# Patient Record
Sex: MALE | Race: WHITE | NOT HISPANIC OR LATINO | Employment: FULL TIME | URBAN - METROPOLITAN AREA
[De-identification: names, ages, dates, MRNs, and addresses within clinical notes are randomized per-mention and may not be internally consistent; named-entity substitution may affect disease eponyms.]

---

## 2019-01-28 PROBLEM — F41.1 GAD (GENERALIZED ANXIETY DISORDER): Status: ACTIVE | Noted: 2019-01-28

## 2021-05-11 ENCOUNTER — TELEPHONE (OUTPATIENT)
Dept: INTERNAL MEDICINE | Facility: CLINIC | Age: 36
End: 2021-05-11

## 2021-05-11 NOTE — TELEPHONE ENCOUNTER
LM for Allison Valladares pt can be set up as a new patient with [facility], however, if he doesn't show it will be the third no/show and he cannot be rescheduled with this practice.

## 2021-06-18 ENCOUNTER — HOSPITAL ENCOUNTER (OUTPATIENT)
Dept: GENERAL RADIOLOGY | Facility: HOSPITAL | Age: 36
Discharge: HOME | End: 2021-06-18
Admitting: NURSE PRACTITIONER

## 2021-06-18 ENCOUNTER — OFFICE VISIT (OUTPATIENT)
Dept: INTERNAL MEDICINE | Facility: CLINIC | Age: 36
End: 2021-06-18

## 2021-06-18 VITALS
HEIGHT: 67 IN | TEMPERATURE: 98.6 F | RESPIRATION RATE: 18 BRPM | OXYGEN SATURATION: 99 % | HEART RATE: 73 BPM | DIASTOLIC BLOOD PRESSURE: 78 MMHG | SYSTOLIC BLOOD PRESSURE: 140 MMHG | BODY MASS INDEX: 33.65 KG/M2 | WEIGHT: 214.4 LBS

## 2021-06-18 DIAGNOSIS — M54.50 CHRONIC MIDLINE LOW BACK PAIN WITHOUT SCIATICA: Primary | ICD-10-CM

## 2021-06-18 DIAGNOSIS — G89.29 CHRONIC MIDLINE LOW BACK PAIN WITHOUT SCIATICA: Primary | ICD-10-CM

## 2021-06-18 DIAGNOSIS — M54.50 CHRONIC MIDLINE LOW BACK PAIN WITHOUT SCIATICA: ICD-10-CM

## 2021-06-18 DIAGNOSIS — G89.29 CHRONIC MIDLINE LOW BACK PAIN WITHOUT SCIATICA: ICD-10-CM

## 2021-06-18 PROCEDURE — 99213 OFFICE O/P EST LOW 20 MIN: CPT | Performed by: NURSE PRACTITIONER

## 2021-06-18 PROCEDURE — 72100 X-RAY EXAM L-S SPINE 2/3 VWS: CPT

## 2021-06-18 RX ORDER — DIPHENOXYLATE HYDROCHLORIDE AND ATROPINE SULFATE 2.5; .025 MG/1; MG/1
TABLET ORAL DAILY
COMMUNITY
End: 2021-12-12

## 2021-06-18 RX ORDER — MELOXICAM 7.5 MG/1
7.5 TABLET ORAL DAILY
Qty: 30 TABLET | Refills: 0 | Status: SHIPPED | OUTPATIENT
Start: 2021-06-18 | End: 2021-12-10

## 2021-06-18 RX ORDER — IBUPROFEN 200 MG
200 TABLET ORAL EVERY 6 HOURS PRN
COMMUNITY
End: 2021-06-18

## 2021-06-18 NOTE — PATIENT INSTRUCTIONS
Degenerative Disk Disease    Degenerative disk disease is a condition caused by changes that occur in the spinal disks as a person ages. Spinal disks are soft and compressible disks located between the bones of your spine (vertebrae). These disks act like shock absorbers.  Degenerative disk disease can affect the whole spine. However, the neck and lower back are most often affected. Many changes can occur in the spinal disks with aging, such as:  · The spinal disks may dry and shrink.  · Small tears may occur in the tough, outer covering of the disk (annulus).  · The disk space may become smaller due to loss of water.  · Abnormal growths in the bone (spurs) may occur. This can put pressure on the nerve roots exiting the spinal canal, causing pain.  · The spinal canal may become narrowed.  What are the causes?  This condition may be caused by:  · Normal degeneration with age.  · Injuries.  · Certain activities and sports that cause damage.  What increases the risk?  The following factors may make you more likely to develop this condition:  · Being overweight.  · Having a family history of degenerative disk disease.  · Smoking.  · Sudden injury.  · Doing work that requires heavy lifting.  What are the signs or symptoms?  Symptoms of this condition include:  · Pain that varies in intensity. Some people have no pain, while others have severe pain. The location of the pain depends on the part of your backbone that is affected. You may have:  ? Pain in your neck or arm if a disk in your neck area is affected.  ? Pain in your back, buttocks, or legs if a disk in your lower back is affected.  · Pain that becomes worse while bending or reaching up, or with twisting movements.  · Pain that may start gradually and then get worse as time passes. It may also start after a major or minor injury.  · Numbness or tingling in the arms or legs.  How is this diagnosed?  This condition may be diagnosed based on:  · Your symptoms and  medical history.  · A physical exam.  · Imaging tests, including:  ? An X-ray of the spine.  ? MRI.  How is this treated?  This condition may be treated with:  · Medicines.  · Rehabilitation exercises. These activities aim to strengthen muscles in your back and abdomen to better support your spine.  If treatments do not help to relieve your symptoms or you have severe pain, you may need surgery.  Follow these instructions at home:  Medicines  · Take over-the-counter and prescription medicines only as told by your health care provider.  · Do not drive or use heavy machinery while taking prescription pain medicine.  · If you are taking prescription pain medicine, take actions to prevent or treat constipation. Your health care provider may recommend that you:  ? Drink enough fluid to keep your urine pale yellow.  ? Eat foods that are high in fiber, such as fresh fruits and vegetables, whole grains, and beans.  ? Limit foods that are high in fat and processed sugars, such as fried or sweet foods.  ? Take an over-the-counter or prescription medicine for constipation.  Activity  · Rest as told by your health care provider.  · Ask your health care provider what activities are safe for you. Return to your normal activities as directed.  · Avoid sitting for a long time without moving. Get up to take short walks every 1-2 hours. This is important to improve blood flow and breathing. Ask for help if you feel weak or unsteady.  · Perform relaxation exercises as told by your health care provider.  · Maintain good posture.  · Do not lift anything that is heavier than 10 lb (4.5 kg), or the limit that you are told, until your health care provider says that it is safe.  · Follow proper lifting and walking techniques as told by your health care provider.  Managing pain, stiffness, and swelling    · If directed, put ice on the painful area. Icing can help to relieve pain.  ? Put ice in a plastic bag.  ? Place a towel between your  skin and the bag.  ? Leave the ice on for 20 minutes, 2-3 times a day.  · If directed, apply heat to the painful area as often as told by your health care provider. Heat can reduce the stiffness of your muscles. Use the heat source that your health care provider recommends, such as a moist heat pack or a heating pad.  ? Place a towel between your skin and the heat source.  ? Leave the heat on for 20-30 minutes.  ? Remove the heat if your skin turns bright red. This is especially important if you are unable to feel pain, heat, or cold. You may have a greater risk of getting burned.  General instructions  · Change your sitting, standing, and sleeping habits as told by your health care provider.  · Avoid sitting in the same position for long periods of time. Change positions frequently.  · Lose weight or maintain a healthy weight as told by your health care provider.  · Do not use any products that contain nicotine or tobacco, such as cigarettes and e-cigarettes. If you need help quitting, ask your health care provider.  · Wear supportive footwear.  · Keep all follow-up visits as told by your health care provider. This is important. This may include visits for physical therapy.  Contact a health care provider if you:  · Have pain that does not go away within 1-4 weeks.  · Lose your appetite.  · Lose weight without trying.  Get help right away if you:  · Have severe pain.  · Notice weakness in your arms, hands, or legs.  · Begin to lose control of your bladder or bowel movements.  · Have fevers or night sweats.  Summary  · Degenerative disk disease is a condition caused by changes that occur in the spinal disks as a person ages.  · Degenerative disk disease can affect the whole spine. However, the neck and lower back are most often affected.  · Take over-the-counter and prescription medicines only as told by your health care provider.  This information is not intended to replace advice given to you by your health care  provider. Make sure you discuss any questions you have with your health care provider.  Document Revised: 12/13/2018 Document Reviewed: 12/13/2018  [employer] Patient Education © 2021 [employer]

## 2021-06-18 NOTE — PROGRESS NOTES
Chief Complaint   Patient presents with   • Back Pain     lower back    • Establish Care       Subjective     Boaz Valladares is a 36 y.o. male being seen for an acute issue for chronic low back pain. He reports that this started in 2003 his senior year of high school. This happened while playing football, but no known injury.  He describes bilateral lower back pain, sometimes left and sometimes right. This has been intermittently since then. Pain is not radiating into legs (it did for the derick 2 years,  prior to epidurals). He denies leg weakness or numbness.  In 2009, he was evaluted by Neurosurgery, Abigail Kelly for lumbar pain He reports that he was told that he was too young for surgery, and the body will heal itself.  He did epidurals (last 2009), aquatic therapy, PT, chiropratic care and accupuncture. He was in the Urgent care for left lower back pain 5-5-2021. He feels like if he catches it early enough, he can get relief with a steroid pack and Ibuprofen.       History of Present Illness     No Known Allergies      Current Outpatient Medications:   •  ibuprofen (ADVIL,MOTRIN) 200 MG tablet, Take 200 mg by mouth Every 6 (Six) Hours As Needed for Mild Pain ., Disp: , Rfl:   •  multivitamin (MULTI-VITAMIN PO), Take  by mouth Daily., Disp: , Rfl:     The following portions of the patient's history were reviewed and updated as appropriate: allergies, current medications, past family history, past medical history, past social history, past surgical history and problem list.    Review of Systems   Constitutional: Negative.  Negative for activity change, appetite change and chills.   HENT: Negative.    Eyes: Negative.    Respiratory: Negative.    Cardiovascular: Negative.  Negative for chest pain, palpitations and leg swelling.   Endocrine: Negative.    Genitourinary: Negative.    Musculoskeletal: Positive for back pain.   Hematological: Negative.  Negative for adenopathy. Does not bruise/bleed easily.    Psychiatric/Behavioral: Negative.        Assessment     Physical Exam  Vitals reviewed.   Constitutional:       Appearance: Normal appearance. He is obese.   Cardiovascular:      Rate and Rhythm: Normal rate and regular rhythm.      Pulses: Normal pulses.      Heart sounds: Normal heart sounds.   Pulmonary:      Effort: Pulmonary effort is normal.      Breath sounds: Normal breath sounds.   Musculoskeletal:      Lumbar back: No swelling, edema, spasms, tenderness or bony tenderness. Decreased range of motion. Negative right straight leg raise test and negative left straight leg raise test. No scoliosis.   Skin:     Capillary Refill: Capillary refill takes less than 2 seconds.   Neurological:      General: No focal deficit present.      Mental Status: He is alert and oriented to person, place, and time.   Psychiatric:         Mood and Affect: Mood normal.         Thought Content: Thought content normal.         Plan         Diagnoses and all orders for this visit:    1. Chronic midline low back pain without sciatica (Primary)  -     XR Spine Lumbar 2 or 3 View; Future  -     Ambulatory Referral to Physical Therapy Evaluate and treat  -     meloxicam (Mobic) 7.5 MG tablet; Take 1 tablet by mouth Daily. For back pain  Dispense: 30 tablet; Refill: 0    Discussed healthy back care, proper foot wear, cushioning joints.    Discussed DDD, expectation for treatment/chronicity, traction therapy, radicular pain. Will treat with conservative measures.     Follow up in 6 months

## 2021-06-29 ENCOUNTER — HOSPITAL ENCOUNTER (OUTPATIENT)
Dept: PHYSICAL THERAPY | Facility: HOSPITAL | Age: 36
Setting detail: THERAPIES SERIES
Discharge: HOME | End: 2021-06-29

## 2021-06-29 DIAGNOSIS — M54.50 CHRONIC MIDLINE LOW BACK PAIN WITHOUT SCIATICA: Primary | ICD-10-CM

## 2021-06-29 DIAGNOSIS — G89.29 CHRONIC MIDLINE LOW BACK PAIN WITHOUT SCIATICA: Primary | ICD-10-CM

## 2021-06-29 PROCEDURE — 97140 MANUAL THERAPY 1/> REGIONS: CPT

## 2021-06-29 PROCEDURE — 97110 THERAPEUTIC EXERCISES: CPT

## 2021-06-29 PROCEDURE — 97161 PT EVAL LOW COMPLEX 20 MIN: CPT

## 2021-06-29 NOTE — THERAPY EVALUATION
"    Outpatient Physical Therapy Ortho Initial Evaluation   Imani Manzo     Patient Name: Boaz Valladares  : 1985  MRN: [id number]  Today's Date: 2021      Visit Date: 2021    Patient Active Problem List   Diagnosis   • JYOTI (generalized anxiety disorder)   • Chronic midline low back pain without sciatica        Past Medical History:   Diagnosis Date   • Anxiety    • Herniated lumbar intervertebral disc    • Low back pain         History reviewed. No pertinent surgical history.    Visit Dx:     ICD-10-CM ICD-9-CM   1. Chronic midline low back pain without sciatica  M54.5 724.2    G89.29 338.29         Patient History     Row Name 21 1300             History    Chief Complaint  Difficulty Walking;Difficulty with daily activities;Joint stiffness;Muscle tenderness;Muscle weakness;Pain;Tightness  -LN      Type of Pain  Back pain;Hip pain L>R  -LN      Date Current Problem(s) Began  -- flare up since 2020.  -LN      Brief Description of Current Complaint  Patient with history of LBP since he was 16; had a wrestling injury. LBP that comes and goes since then. Has occasional flare-ups- bends over or coughs and has pain. Has done epidurals, aquatic therapy, PT, chiropractor, acupuncture. Has had some benefit from chiropractor.  most recent flare up since 2020. He did have a fall stepping out of his trunk in December and slipped on ice.  Patient reports hx of herniated discs when he was younger. \"My doctor said I have degeneration in my back.\"   -LN      Previous treatment for THIS PROBLEM  Injections;Chiropractor;Rehabilitation;Medication;Massage;Pain Management epidurals  -LN      Patient/Caregiver Goals  Relieve pain;Know what to do to help the symptoms  -LN      Patient/Caregiver Goals Comment  \"To feel better and reduce pain level.\"   -LN      Occupation/sports/leisure activities  works on rail cars; likes to fish, hunt, weight lift and wrestling.  -LN      Patient seeing anyone " else for problem(s)?  PCP  -LN      How has patient tried to help current problem?  steroid shot, epidurals- 10 years ago; chirpractor, HP; home TENS unit; pain meds if needed from PCP  -LN      What clinical tests have you had for this problem?  MRI;X-ray  -LN      Results of Clinical Tests  herniated L2 and L4 per patient in past; x-ray showed DDD per patient. Minimal multilevel degenerative disc space narrowing.  -LN      Related/Recent Hospitalizations  No  -LN      Surgery/Hospitalization  n/a  -LN      History of Previous Related Injuries  wrestling injury when he was 16 or 17; fall in Dec. 2020.   -LN         Pain     Pain Location  Back;Hip B  hips; L>R  -LN      Pain at Present  4  -LN      Pain at Best  2;3  -LN      Pain at Worst  6;7  -LN      Pain Frequency  Constant/continuous  -LN      Pain Description  Tightness;Aching  -LN      What Performance Factors Make the Current Problem(s) WORSE?  bending over; after sitting a long time and going to stand; standing too long and go to sit; walking  -LN      What Performance Factors Make the Current Problem(s) BETTER?  lying down- on left side with pillow between knees with feet propped up.  -LN      Tolerance Time- Standing  1 hour limit  -LN      Tolerance Time- Sitting  1 hour limit  -LN      Tolerance Time- Walking  limited  -LN      Tolerance Time- Lying  okay if he is on left side  -LN      Is your sleep disturbed?  No  -LN      What position do you sleep in?  Left sidelying  -LN      Difficulties at work?  can do job but with sx  -LN      Difficulties with ADL's?  can do but with stiffness  -LN      Difficulties with recreational activities?  can do all with sx  -LN         Fall Risk Assessment    Any falls in the past year:  Yes  -LN      Number of falls reported in the last 12 months  1  -LN      Factors that contributed to the fall:  Slippery surface  -LN         Services    Prior Rehab/Home Health Experiences  Yes  -LN      When was the prior  experience with Rehab/Home Health  years ago- when he was younger  -LN      Where was the prior experience with Rehab/Home Health  did not state  -LN      Are you currently receiving Home Health services  No  -LN      Do you plan to receive Home Health services in the near future  No  -LN         Daily Activities    Primary Language  English  -LN      How does patient learn best?  Listening;Demonstration  -LN      Teaching needs identified  Home Exercise Program;Management of Condition;Other (comment) Risks and benefits of treatment explained to patient.  -LN      Patient is concerned about/has problems with  Flexibility;Performing home management (household chores, shopping, care of dependents);Performing job responsibilities/community activities (work, school,;Performing sports, recreation, and play activities;Sitting;Standing;Transfers (getting out of a chair, bed);Walking  -LN      Does patient have problems with the following?  None  -LN      Barriers to learning  None  -LN      Pt Participated in POC and Goals  Yes  -LN         Safety    Are you being hurt, hit, or frightened by anyone at home or in your life?  No  -LN      Are you being neglected by a caregiver  No  -LN      Have you had any of the following issues with  N/A  -LN        User Key  (r) = Recorded By, (t) = Taken By, (c) = Cosigned By    Initials Name Provider Type    Tonie Sykes, PT Physical Therapist          PT Ortho     Row Name 06/29/21 1400       Subjective Comments    Subjective Comments  Patient reports hx of flare ups of back pain since he was 16 & then he is good for awhile and then he will bend over or cough and pain is back.    Patient c/o pain in L>R hips and pain that radiates into left thigh. No c/o any N/T in legs.  -LN        Precautions and Contraindications    Precautions/Limitations  no known precautions/limitations  -LN       Subjective Pain    Able to rate subjective pain?  yes  -LN    Pre-Treatment Pain  "Level  4  -LN       Posture/Observations    Rounded Shoulders  Bilateral:;Mild  -LN    Lumbar lordosis  Mild;Increased;Standing posture  -LN    Iliac crests  Left:;Elevated;Standing posture  -LN       Lumbosacral Accessory Motions    PA Glide- L3  Left:;Hypomobile  -LN    PA Glide- L4  Left:;Hypomobile  -LN    PA Glide- L5  Left:;Hypomobile  -LN    Innominate rotation  Left:;Hypomobile sx of left anterior innominate  -LN    Innominate outflare  Left:;Hypomobile sx of left outflare  -LN       Lumbar/SI Special Tests    Standing Flexion Test (SI Dysfunction)  Left:;Positive  -LN    SLR (Neural Tension)  Bilateral:;Negative mild left LBP with right SLR  -LN    SI Compression Test (SI Dysfunction)  Left:;Positive  -LN    SI Distraction Test (SI Dysfunction)  Left:;Positive  -LN       Lumbosacral Palpation    SI  Left:;Tender  -LN    Spinous Process  Tender tender tamiko at L4 level  -LN    Erector Spinae (Paraspinals)  Bilateral:;Tender;Guarded/taut L>R  -LN    Hamstring  Bilateral:;Guarded/taut  -LN    Lumbosacral Palpation Comments  tender left PSIS and ASIS  -LN       Leg Length Test    True  Equal  -LN    Apparent  Unequal;Left Higher Leg sx of left pelvic obliquity  -LN       General ROM    GENERAL ROM COMMENTS  B LE AROM WFL.   -LN       Head/Neck/Trunk    Trunk Extension AROM  WFL- discomfort  -LN    Trunk Flexion AROM  75%- \"feels better\"  -LN    Trunk Lt Lateral Flexion AROM  WFl- no pain  -LN    Trunk Rt Lateral Flexion AROM  50%- left LBP  -LN    Trunk Lt Rotation AROM  WFL  -LN    Trunk Rt Rotation AROM  WFL   -LN       MMT Neck/Trunk    Trunk Flexion MMT, Gross Movement  (4+/5) good plus  -LN    Trunk Extension MMT, Gross Movement  (4/5) good  -LN    Neck/Trunk Comments   grossly  -LN       MMT Right Lower Ext    Rt Hip Flexion MMT, Gross Movement  (5/5) normal  -LN    Rt Hip Extension MMT, Gross Movement  (5/5) normal  -LN    Rt Hip ABduction MMT, Gross Movement  (5/5) normal  -LN    Rt Hip ADduction MMT, " Gross Movement  (5/5) normal  -LN    Rt Knee Extension MMT, Gross Movement  (5/5) normal  -LN    Rt Knee Flexion MMT, Gross Movement  (5/5) normal  -LN    Rt Ankle Plantarflexion MMT, Gross Movement  (5/5) normal  -LN    Rt Ankle Dorsiflexion MMT, Gross Movement  (5/5) normal  -LN       MMT Left Lower Ext    Lt Hip Flexion MMT, Gross Movement  (5/5) normal  -LN    Lt Hip Extension MMT, Gross Movement  (5/5) normal  -LN    Lt Hip ABduction MMT, Gross Movement  (5/5) normal  -LN    Lt Hip ADduction MMT, Gross Movement  (5/5) normal  -LN    Lt Knee Extension MMT, Gross Movement  (5/5) normal  -LN    Lt Knee Flexion MMT, Gross Movement  (5/5) normal  -LN    Lt Ankle Plantarflexion MMT, Gross Movement  (5/5) normal  -LN    Lt Ankle Dorsiflexion MMT, Gross Movement  (5/5) normal  -LN       Sensation    Sensation WNL?  WNL  -LN    Light Touch  No apparent deficits  -LN       Lower Extremity Flexibility    Hamstrings  Bilateral:;Moderately limited  -LN    ITB  Right:;Mildly limited;Left:;Moderately limited  -LN    Hip Adductors  Bilateral:;Mildly limited;Moderately limited  -LN    Hip External Rotators  Right:;Mildly limited;Left:;Moderately limited  -LN    Gastrocnemius  Bilateral:;Mildly limited;Moderately limited  -LN       Transfers    Sit-Stand [place] (Transfers)  independent  -LN    Stand-Sit [place] (Transfers)  independent  -LN    Transfers, Sit-Stand-Sit, Assist Device  other (see comments) none  -LN       Gait/Stairs (Locomotion)    Comment (Gait/Stairs)  Patient independent with all functional mobility and gait; no AD used. Occasional guarded mobility noted tamiko with transitional movements.   -LN      User Key  (r) = Recorded By, (t) = Taken By, (c) = Cosigned By    Initials Name Provider Type    Tonie Sykes, PT Physical Therapist                      Therapy Education  Education Details: Patient to work on HEP 2 x day as tolerated and use MH and home TENS unit as needed.  Spoke to  patient about what dry needling is and its purpose and how it may help him. Patient to think about it and let therapist know if he wants to try dry needling.  Given: HEP, Symptoms/condition management, Pain management  Program: New  How Provided: Verbal, Demonstration, Written  Provided to: Patient  Level of Understanding: Teach back education performed, Verbalized, Demonstrated     PT OP Goals     Row Name 06/29/21 1400          PT Short Term Goals    STG Date to Achieve  07/13/21  -LN     STG 1  Patient to verbally report decreased LBP to <4/10 with ADLs and everyday home and work activities.  -LN     STG 2  Patient independent with initial HEP issued by therapist.  -LN     STG 3  Trunk ROM improved by 25%.  -LN     STG 4  Patient able to maintain good pelvic and sacral alignment between PT sessions with use of MET and core stabilization.   -LN        Long Term Goals    LTG Date to Achieve  07/27/21  -LN     LTG 1  Patient to verbally report decreased LBP to 1-2/10 with ADLs and everyday home and work activities.  -LN     LTG 2  Patient independent with more advanced HEP issued by therapist.  -LN     LTG 3  Trunk ROM WFL and painfree all planes.   -LN     LTG 4  Patient with improved flexibility noted bilateral hamstrings/ITB/hips when tested.   -LN     LTG 5  Patient able to stand at work for 2 hours without any c/o increased LBP or hip pain.  -LN     LTG 6  Core strength improved to 4+-5/5.   -LN        Time Calculation    PT Goal Re-Cert Due Date  07/27/21  -LN       User Key  (r) = Recorded By, (t) = Taken By, (c) = Cosigned By    Initials Name Provider Type    Tonie Sykes, PT Physical Therapist          PT Assessment/Plan     Row Name 06/29/21 1400          PT Assessment    Functional Limitations  Impaired gait;Limitation in home management;Limitations in community activities;Limitations in functional capacity and performance;Performance in leisure activities;Performance in self-care  ADL;Performance in sport activities;Performance in work activities  -LN     Impairments  Gait;Impaired flexibility;Muscle strength;Pain;Posture;Range of motion  -LN     Assessment Comments  Patient presents with hx of LBP since he was 16 after a wrestling injury with flare ups of pain every since. Patient presents with c/o LBP and hip pain, L>R since December 2020; he did have a fall on ice in December a couple weeks before flare up occurred. Patient with c/o pain L>R LB/LS area and into left hip/thigh, decreased trunk ROM, decreased core strength, tenderness, tamiko at L4 level, decreased flexibility B LE, L>R; sx of left pelvic obliquity; decreased sitting and standing tolerance. Patient with sx of lumbar DDD.   -LN     Please refer to paper survey for additional self-reported information  Yes  -LN     Rehab Potential  Good but guarded due to pain being chronic  -LN     Patient/caregiver participated in establishment of treatment plan and goals  Yes  -LN     Patient would benefit from skilled therapy intervention  Yes  -LN        PT Plan    PT Frequency  1x/week;2x/week  -LN     Predicted Duration of Therapy Intervention (PT)  4 weeks  -LN     Planned CPT's?  PT EVAL LOW COMPLEXITY: 19346;PT THER PROC EA 15 MIN: 23918;PT MANUAL THERAPY EA 15 MIN: 10004;PT HOT OR COLD PACK TREAT MCARE;PT ELECTRICAL STIM UNATTEND: ;PT ULTRASOUND EA 15 MIN: 26806;PT TRACTION LUMBAR: 75783  -LN     Physical Therapy Interventions (Optional Details)  lumbar stabilization;manual therapy techniques;modalities;patient/family education;postural re-education;ROM (Range of Motion);strengthening;stretching;taping;dry needling  -LN     PT Plan Comments  See patient 1-2 x week for therapeutic exercise/core stabilization with HEP; MET PRN; modalities PRN (IFC/MH/CP/US); Kinesiotape PRN. Consider trial of lumbar traction. Consider dry needling. Patient education.   -LN       User Key  (r) = Recorded By, (t) = Taken By, (c) = Cosigned By     Initials Name Provider Type    LN Tonie Mariano, PT Physical Therapist          Modalities     Row Name 06/29/21 1400             Moist Heat    Patient denies application of MH  Yes to use HP PRN at home with home TENS unit  -LN        User Key  (r) = Recorded By, (t) = Taken By, (c) = Cosigned By    Initials Name Provider Type    LN Tonie Mariano, PT Physical Therapist        OP Exercises     Row Name 06/29/21 1400             Precautions    Existing Precautions/Restrictions  no known precautions/restrictions  -LN         Subjective Comments    Subjective Comments  Patient reports hx of flare ups of back pain since he was 16 & then he is good for awhile and then he will bend over or cough and pain is back.   -LN         Subjective Pain    Able to rate subjective pain?  yes  -LN      Pre-Treatment Pain Level  4  -LN         Total Minutes    20125 - PT Therapeutic Exercise Minutes  15  -LN      68516 - PT Manual Therapy Minutes  12  -LN         Exercise 1    Exercise Name 1  PPT  -LN      Cueing 1  Verbal;Demo  -LN      Reps 1  10  -LN      Time 1  5 sec  -LN         Exercise 2    Exercise Name 2  PPT with ball squeeze (hooklying)  -LN      Cueing 2  Verbal  -LN      Reps 2  10  -LN      Time 2  5 sec  -LN         Exercise 3    Exercise Name 3  PPT with supine clamshells vs TB  -LN      Cueing 3  Verbal  -LN      Reps 3  10  -LN      Time 3  5 sec  -LN      Additional Comments  silver TB  -LN         Exercise 4    Exercise Name 4  supine HS stretch with sheet  -LN      Cueing 4  Verbal;Tactile;Demo  -LN      Reps 4  5 each leg  -LN      Time 4  10 sec   -LN         Exercise 5    Exercise Name 5  supine HS stretch with sheet with adduction (ITB)  -LN Unilateral pressup on right     Cueing 5  Verbal;Tactile;Demo  -LN Verbal; tactile     Reps 5  5 each leg  -LN 10     Time 5  10 sec  -LN 10 sec       User Key  (r) = Recorded By, (t) = Taken By, (c) = Cosigned By    Initials Name Provider Type    LN  Tonie Mariano, PT Physical Therapist           Manual Rx (last 36 hours)      Manual Treatments     Row Name 06/29/21 1400             Total Minutes    34721 - PT Manual Therapy Minutes  12  -LN         Manual Rx 1    Manual Rx 1 Location  Pelvis and sacrum  -LN      Manual Rx 1 Type  MET- shotgun/left anterior innominate/left pelvic outflare/ FRSL with R on L posterior sacral torsion  -LN      Manual Rx 1 Duration  Good pelvic and sacral alignment noted after MET.  -LN        User Key  (r) = Recorded By, (t) = Taken By, (c) = Cosigned By    Initials Name Provider Type    Tonie Sykes, PT Physical Therapist                      Outcome Measure Options: Other Outcome Measure  Other Outcome Measure Tool Used  Other Outcome Measure Tool Comments: Back index- score of 15 today.      Time Calculation:     Start Time: 1350  Stop Time: 1450  Time Calculation (min): 60 min  Timed Charges  81415 - PT Therapeutic Exercise Minutes: 15  55963 - PT Manual Therapy Minutes: 12  Untimed Charges  PT Eval/Re-eval Minutes: 30  Total Minutes  Timed Charges Total Minutes: 27  Untimed Charges Total Minutes: 30   Total Minutes: 30     Therapy Charges for Today     Code Description Service Date Service Provider Modifiers Qty    23876083559 HC PT THER PROC EA 15 MIN 6/29/2021 Tonie Mariano, PT GP 1    03045060522 HC PT MANUAL THERAPY EA 15 MIN 6/29/2021 Tonie Mariano, PT GP 1    40325903656 HC PT EVAL LOW COMPLEXITY 2 6/29/2021 Tonie Mariano, PT GP 1          PT G-Codes  Outcome Measure Options: Other Outcome Measure         Tonie Mariano, JAZLYN  6/29/2021

## 2021-12-10 DIAGNOSIS — G89.29 CHRONIC MIDLINE LOW BACK PAIN WITHOUT SCIATICA: ICD-10-CM

## 2021-12-10 DIAGNOSIS — M54.50 CHRONIC MIDLINE LOW BACK PAIN WITHOUT SCIATICA: ICD-10-CM

## 2021-12-10 RX ORDER — MELOXICAM 7.5 MG/1
TABLET ORAL
Qty: 30 TABLET | Refills: 0 | Status: SHIPPED | OUTPATIENT
Start: 2021-12-10 | End: 2021-12-12

## 2024-01-16 ENCOUNTER — OFFICE VISIT (OUTPATIENT)
Dept: INTERNAL MEDICINE | Facility: CLINIC | Age: 39
End: 2024-01-16
Payer: COMMERCIAL

## 2024-01-16 VITALS
HEART RATE: 90 BPM | HEIGHT: 67 IN | WEIGHT: 218 LBS | BODY MASS INDEX: 34.21 KG/M2 | OXYGEN SATURATION: 98 % | SYSTOLIC BLOOD PRESSURE: 116 MMHG | DIASTOLIC BLOOD PRESSURE: 66 MMHG | TEMPERATURE: 98.6 F

## 2024-01-16 DIAGNOSIS — M51.36 DDD (DEGENERATIVE DISC DISEASE), LUMBAR: ICD-10-CM

## 2024-01-16 DIAGNOSIS — M54.16 LUMBAR RADICULOPATHY: Primary | ICD-10-CM

## 2024-01-16 PROBLEM — M51.369 DDD (DEGENERATIVE DISC DISEASE), LUMBAR: Status: ACTIVE | Noted: 2024-01-16

## 2024-01-16 PROCEDURE — 99213 OFFICE O/P EST LOW 20 MIN: CPT | Performed by: NURSE PRACTITIONER

## 2024-01-16 RX ORDER — MELOXICAM 15 MG/1
15 TABLET ORAL DAILY
Qty: 90 TABLET | Refills: 1 | Status: SHIPPED | OUTPATIENT
Start: 2024-01-16

## 2024-01-16 NOTE — PROGRESS NOTES
Chief Complaint   Patient presents with    Back Pain     Left lower back that wraps around left leg and goes down       Subjective     Boaz Valladares is a 38 y.o. male being seen for an acute visit for lumbar pain radiating into left leg. He has been seen by the urgent care 3-, 9- and 11- for back pain. XR Spine Lumbar 2 or 3 View (06/18/2021 11:19) He has history of Multilevel DDD. He reports that this started in 2003 his senior year of high school. This happened while playing football, but no known injury.  He describes bilateral lower back pain, left > right. This has been intermittently since then. Pain is not radiating into legs (it did for the derick 2 years,  prior to epidurals). He denies leg weakness or numbness.  In 2009, he was evaluted by Neurosurgery, Abigail Kelly for lumbar pain He reports that he was told that he was too young for surgery, and the body will heal itself.  He did epidurals (last 2009), aquatic therapy, PT, chiropratic care and accupuncture.  Today, pain in left lower radiating into left outer thigh, rated 6-7 of 10. Worse with laying down or sitting.         Back Pain         No Known Allergies    No current outpatient medications on file.    The following portions of the patient's history were reviewed and updated as appropriate: allergies, current medications, past family history, past medical history, past social history, past surgical history, and problem list.    Review of Systems   Constitutional: Negative.    HENT: Negative.     Eyes: Negative.    Respiratory: Negative.     Gastrointestinal: Negative.    Endocrine: Negative.    Musculoskeletal:  Positive for back pain.   All other systems reviewed and are negative.      Assessment     Physical Exam  Vitals reviewed.   Constitutional:       Appearance: Normal appearance. He is not ill-appearing.   Cardiovascular:      Rate and Rhythm: Normal rate and regular rhythm.      Pulses: Normal pulses.      Heart  sounds: Normal heart sounds. No murmur heard.  Pulmonary:      Effort: Pulmonary effort is normal. No respiratory distress.      Breath sounds: Normal breath sounds. No stridor.   Musculoskeletal:      Lumbar back: Decreased range of motion. Negative right straight leg raise test and negative left straight leg raise test.   Neurological:      General: No focal deficit present.      Mental Status: He is alert and oriented to person, place, and time.   Psychiatric:         Mood and Affect: Mood normal.         Behavior: Behavior normal.         Thought Content: Thought content normal.         Plan         Diagnoses and all orders for this visit:    1. Lumbar radiculopathy (Primary)  -     MRI Lumbar Spine With & Without Contrast; Future  -     meloxicam (Mobic) 15 MG tablet; Take 1 tablet by mouth Daily.  Dispense: 90 tablet; Refill: 1  -     Ambulatory Referral to Hospital Pain Management Department    2. DDD (degenerative disc disease), lumbar  -     MRI Lumbar Spine With & Without Contrast; Future  -     meloxicam (Mobic) 15 MG tablet; Take 1 tablet by mouth Daily.  Dispense: 90 tablet; Refill: 1  -     Ambulatory Referral to Hospital Pain Management Department      Update MRI lumbar spine. Discussed lumbar radicular pain.     Refer to pain mgnt

## 2024-01-17 ENCOUNTER — TELEPHONE (OUTPATIENT)
Dept: INTERNAL MEDICINE | Facility: CLINIC | Age: 39
End: 2024-01-17

## 2024-01-17 RX ORDER — METAXALONE 800 MG/1
800 TABLET ORAL 3 TIMES DAILY PRN
Qty: 21 TABLET | Refills: 0 | Status: SHIPPED | OUTPATIENT
Start: 2024-01-17

## 2024-01-22 ENCOUNTER — TELEPHONE (OUTPATIENT)
Dept: INTERNAL MEDICINE | Facility: CLINIC | Age: 39
End: 2024-01-22
Payer: COMMERCIAL

## 2024-01-22 DIAGNOSIS — M54.16 LUMBAR RADICULOPATHY: Primary | ICD-10-CM

## 2024-01-22 NOTE — TELEPHONE ENCOUNTER
JENNIFER FROM [facility] WANTS TO DOUBLE CHECK TO SEE IF YOUR SURE YOU WANT WITH CONTRAST.  CAN YOU GIVE HER A CALL [phone].  MR. ROBINS HAS APT TOMORROW.

## 2024-01-23 ENCOUNTER — TELEPHONE (OUTPATIENT)
Dept: INTERNAL MEDICINE | Facility: CLINIC | Age: 39
End: 2024-01-23
Payer: COMMERCIAL

## 2024-01-23 ENCOUNTER — HOSPITAL ENCOUNTER (OUTPATIENT)
Dept: MRI IMAGING | Facility: HOSPITAL | Age: 39
Discharge: HOME | End: 2024-01-23
Admitting: NURSE PRACTITIONER
Payer: COMMERCIAL

## 2024-01-23 DIAGNOSIS — M51.36 BULGING OF LUMBAR INTERVERTEBRAL DISC: ICD-10-CM

## 2024-01-23 DIAGNOSIS — M54.16 LUMBAR RADICULOPATHY: ICD-10-CM

## 2024-01-23 DIAGNOSIS — M48.062 SPINAL STENOSIS OF LUMBAR REGION WITH NEUROGENIC CLAUDICATION: Primary | ICD-10-CM

## 2024-01-23 DIAGNOSIS — M51.36 DDD (DEGENERATIVE DISC DISEASE), LUMBAR: ICD-10-CM

## 2024-01-23 PROCEDURE — 72148 MRI LUMBAR SPINE W/O DYE: CPT

## 2024-01-23 NOTE — TELEPHONE ENCOUNTER
Name: Boaz Valladares    Relationship: Self    Best Callback Number: [phone]    HUB PROVIDED THE RELAY MESSAGE FROM THE OFFICE   PATIENT HAS FURTHER QUESTIONS AND WOULD LIKE A CALL BACK AT THE FOLLOWING PHONE NUMBER [phone]    ADDITIONAL INFORMATION:HE ALREADY HAS AN APPOINTMENT WITH PAIN MANAGEMENT.  THEY WERE WAITING ON THE RESULTS.  DOES HE STILL NEED TO SEE THEM OR CANCEL THE APPOINTMENT?

## 2024-01-23 NOTE — TELEPHONE ENCOUNTER
Yes, he should. It would be better for epidurals to relieve pain, but I want the neurosurgery eval in place.

## 2024-01-23 NOTE — TELEPHONE ENCOUNTER
Lvm to call back regarding results    Relay:  I referring him back to neurosurgery due to large bulging disc and sever stenosis, possible surgical intervention. Neurosurgery should contact him soon with an appointment

## 2024-01-30 ENCOUNTER — HOSPITAL ENCOUNTER (OUTPATIENT)
Dept: PAIN MEDICINE | Facility: HOSPITAL | Age: 39
Discharge: HOME | End: 2024-01-30
Admitting: STUDENT IN AN ORGANIZED HEALTH CARE EDUCATION/TRAINING PROGRAM
Payer: COMMERCIAL

## 2024-01-30 ENCOUNTER — ANESTHESIA EVENT (OUTPATIENT)
Dept: PAIN MEDICINE | Facility: HOSPITAL | Age: 39
End: 2024-01-30

## 2024-01-30 ENCOUNTER — ANESTHESIA (OUTPATIENT)
Dept: PAIN MEDICINE | Facility: HOSPITAL | Age: 39
End: 2024-01-30

## 2024-01-30 VITALS — HEIGHT: 67 IN | WEIGHT: 220 LBS | BODY MASS INDEX: 34.53 KG/M2

## 2024-01-30 DIAGNOSIS — M51.36 DDD (DEGENERATIVE DISC DISEASE), LUMBAR: ICD-10-CM

## 2024-01-30 DIAGNOSIS — M54.16 LUMBAR RADICULOPATHY: Primary | ICD-10-CM

## 2024-01-30 PROCEDURE — G0463 HOSPITAL OUTPT CLINIC VISIT: HCPCS

## 2024-01-30 NOTE — H&P
CHIEF COMPLAINT:   Low back pain    HISTORY OF PRESENT ILLNESS:  Is a 38-year-old male who presents with chronic back pain.  His pain for started many years ago and is associated with playing football in high school.  He saw a neurosurgeon previously who recommended nonoperative intervention.  He has had epidural injections before, the last one was in 2009.  Review of lumbar MRI shows multiple levels of disc herniation, the worst at L2-3.  He feels that his back pain improved after his injections for years.  However it has slowly begun to worsen.  This winter, his pain has been intolerable, requiring visits to the emergency room.  Pain has been keeping him from sleeping.    His pain is described as severe, aching, gripping and radiating to his thigh and groin.  He complains of diminished sensation in his left leg.  The pain is worse at night while he is trying to sleep.  However it improved somewhat during the daytime.    Conservative therapy that he has tried include:  Medications, Mobic, oral steroids.  Aqua therapy, acupuncture, chiropractor visits    He is scheduled to see Dr. Stratton this summer for evaluation.    PAST MEDICAL HISTORY:  Current Outpatient Medications on File Prior to Encounter   Medication Sig Dispense Refill    meloxicam (Mobic) 15 MG tablet Take 1 tablet by mouth Daily. 90 tablet 1    metaxalone (Skelaxin) 800 MG tablet Take 1 tablet by mouth 3 (Three) Times a Day As Needed for Muscle Spasms. 21 tablet 0    methylPREDNISolone (MEDROL) 4 MG dose pack Take as directed on package instructions. 21 tablet 0     No current facility-administered medications on file prior to encounter.       Past Medical History:   Diagnosis Date    Anxiety     Herniated lumbar intervertebral disc     Low back pain          SOCIAL HISTORY:  No tobacco    REVIEW OF SYSTEMS:  No hematologic infectious or constitutional symptoms  Other review of systems non-contributory  Negative screen for JAMES      PHYSICAL  "EXAM:  Ht 170.2 cm (67\")   Wt 99.8 kg (220 lb)   BMI 34.46 kg/m²   Well-developed well-nourished no acute distress  Extra ocular movements intact  Mallampati class 2 airway  Alert and oriented ×3  Deep tendon reflexes normal in the bilateral patella  Negative straight leg raise bilaterally  5 out of 5 strength bilateral upper and lower extremities  Lumbar spine without obvious deformities ecchymoses  Lumbar spine TTP  Diminished sensation over left knee        DIAGNOSIS:  Post-Op Diagnosis Codes:     * Lumbar radiculopathy [M54.16]    PLAN:  1.  Lumbar 2-3 epidural steroid injections, up to 3. If pain control is acceptable 1 or 2 injections, it was discussed with the patient that they may return for the subsequent injections if and when their pain returns.  The risks were discussed with the patient including failure of relief, worsening pain, Headache (post dural puncture headache), bleeding (epidural hematoma) and infection (epidural abscess or skin infection).  2.  Physical therapy exercises at home as prescribed by physical therapy or from the pain clinic handout.  Continuation of these exercises every day, or multiple times per week, even when the patient has good pain relief, was stressed to the patient as a preventative measure to decrease the frequency and severity of future pain episodes.  3.  Continue pain medicines as already prescribed.  If patient not currently taking any, it is recommended to begin Acetaminophen 1000 mg po q 8 hours.  If other medicines containing Acetaminophen are currently prescribed, maintain daily dose at 3000 mg.    4.  If they can tolerate NSAIDS, it is recommended to take Ibuprofen 600 mg po q 6 hours for 7 days during pain exacerbations.  Alternatively, they may substitute an NSAID of their choice (e.g. Aleve).  This may be taken at the same time as Acetaminophen.  5.  Heat and ice to the affected area as tolerated for pain control.    6.  Daily low impact exercise such as " walking or water exercise was recommended to maintain overall health and aid in weight control.   7.  Follow up as needed for subsequent injections.    Consult time: 10:45-11:27

## 2024-02-01 ENCOUNTER — ANESTHESIA EVENT (OUTPATIENT)
Dept: PAIN MEDICINE | Facility: HOSPITAL | Age: 39
End: 2024-02-01
Payer: COMMERCIAL

## 2024-02-02 ENCOUNTER — HOSPITAL ENCOUNTER (OUTPATIENT)
Dept: PAIN MEDICINE | Facility: HOSPITAL | Age: 39
Discharge: HOME | End: 2024-02-02
Payer: COMMERCIAL

## 2024-02-02 ENCOUNTER — HOSPITAL ENCOUNTER (OUTPATIENT)
Dept: GENERAL RADIOLOGY | Facility: HOSPITAL | Age: 39
Discharge: HOME | End: 2024-02-02
Payer: COMMERCIAL

## 2024-02-02 ENCOUNTER — ANESTHESIA (OUTPATIENT)
Dept: PAIN MEDICINE | Facility: HOSPITAL | Age: 39
End: 2024-02-02
Payer: COMMERCIAL

## 2024-02-02 VITALS
DIASTOLIC BLOOD PRESSURE: 75 MMHG | RESPIRATION RATE: 14 BRPM | TEMPERATURE: 98.3 F | SYSTOLIC BLOOD PRESSURE: 125 MMHG | HEART RATE: 70 BPM | OXYGEN SATURATION: 98 %

## 2024-02-02 DIAGNOSIS — R52 PAIN: ICD-10-CM

## 2024-02-02 DIAGNOSIS — M54.16 LUMBAR RADICULOPATHY: ICD-10-CM

## 2024-02-02 PROCEDURE — 77003 FLUOROGUIDE FOR SPINE INJECT: CPT

## 2024-02-02 PROCEDURE — 25010000002 METHYLPREDNISOLONE PER 80 MG: Performed by: ANESTHESIOLOGY

## 2024-02-02 PROCEDURE — 25010000002 MIDAZOLAM PER 1 MG: Performed by: ANESTHESIOLOGY

## 2024-02-02 RX ORDER — IOPAMIDOL 408 MG/ML
3 INJECTION, SOLUTION INTRATHECAL
Status: DISCONTINUED | OUTPATIENT
Start: 2024-02-02 | End: 2024-02-03 | Stop reason: HOSPADM

## 2024-02-02 RX ORDER — LIDOCAINE HYDROCHLORIDE 10 MG/ML
1 INJECTION, SOLUTION INFILTRATION; PERINEURAL ONCE
Status: DISCONTINUED | OUTPATIENT
Start: 2024-02-02 | End: 2024-02-03 | Stop reason: HOSPADM

## 2024-02-02 RX ORDER — SODIUM CHLORIDE 0.9 % (FLUSH) 0.9 %
10 SYRINGE (ML) INJECTION AS NEEDED
Status: DISCONTINUED | OUTPATIENT
Start: 2024-02-02 | End: 2024-02-03 | Stop reason: HOSPADM

## 2024-02-02 RX ORDER — SODIUM CHLORIDE 0.9 % (FLUSH) 0.9 %
10 SYRINGE (ML) INJECTION EVERY 12 HOURS SCHEDULED
Status: DISCONTINUED | OUTPATIENT
Start: 2024-02-02 | End: 2024-02-03 | Stop reason: HOSPADM

## 2024-02-02 RX ORDER — MIDAZOLAM HYDROCHLORIDE 1 MG/ML
1 INJECTION INTRAMUSCULAR; INTRAVENOUS
Status: DISCONTINUED | OUTPATIENT
Start: 2024-02-02 | End: 2024-02-03 | Stop reason: HOSPADM

## 2024-02-02 RX ORDER — SODIUM CHLORIDE 9 MG/ML
40 INJECTION, SOLUTION INTRAVENOUS AS NEEDED
Status: DISCONTINUED | OUTPATIENT
Start: 2024-02-02 | End: 2024-02-03 | Stop reason: HOSPADM

## 2024-02-02 RX ORDER — FENTANYL CITRATE 50 UG/ML
50 INJECTION, SOLUTION INTRAMUSCULAR; INTRAVENOUS AS NEEDED
Status: DISCONTINUED | OUTPATIENT
Start: 2024-02-02 | End: 2024-02-03 | Stop reason: HOSPADM

## 2024-02-02 RX ORDER — METHYLPREDNISOLONE ACETATE 80 MG/ML
80 INJECTION, SUSPENSION INTRA-ARTICULAR; INTRALESIONAL; INTRAMUSCULAR; SOFT TISSUE ONCE
Status: COMPLETED | OUTPATIENT
Start: 2024-02-02 | End: 2024-02-02

## 2024-02-02 RX ADMIN — MIDAZOLAM 2 MG: 1 INJECTION INTRAMUSCULAR; INTRAVENOUS at 13:31

## 2024-02-02 RX ADMIN — METHYLPREDNISOLONE ACETATE 80 MG: 80 INJECTION, SUSPENSION INTRA-ARTICULAR; INTRALESIONAL; INTRAMUSCULAR; SOFT TISSUE at 13:31

## 2024-02-02 NOTE — DISCHARGE INSTRUCTIONS

## 2024-02-02 NOTE — H&P
INTERVAL HISTORY:    The patient returns for his first lumbar epidural steroid injection today.  They have received N/A% improvement since their last injection with a pain level of 7/10 at its worst recently.    Conservative measures tried in the interim. Daily activities are still affected by the pain.  Upon talking with the patient his symptoms he states are more in his hips coming around and then into his groin area.  Looking at his MRI and from his history it sounds like we need to go more at the L4-5 level versus the 2 3 level, especially since the medicine will travel upward better than it will travel down.    Radiology reports and/or previous notes have been reviewed and are consistent with their diagnosis of Post-Op Diagnosis Codes:     * Lumbar radiculopathy [M54.16]    Alert and oriented  MP - 2  Lungs - clear  CV - rrr    Diagnosis:  Post-Op Diagnosis Codes:     * Lumbar radiculopathy [M54.16]      Plan:  Lumbar epidural steroid injection under fluoroscopic guidance        Target : L4-5    I have encouraged them to continue:  1.  Physical therapy exercises at home as prescribed by physical therapy or from the pain clinic handout (given to the patient).  Continuation of these exercises every day, or multiple times per week, even when the patient has good pain relief, was stressed to the patient as a preventative measure to decrease the frequency and severity of future pain episodes.  2.  Continue pain medicines as already prescribed.  If patient not currently taking any, it is recommended to begin Acetaminophen 1000 mg po q 8 hours.  If other medicines containing Acetaminophen are currently prescribed, maintain daily dose at 3000mg.    3.  If they can tolerate NSAIDS, it is recommended to take Ibuprofen 600 mg po q 6 hours for 7 days during pain exacerbations.   Alternatively, they may substitute an NSAID of their choice (e.g. Aleve)  4.  Heat and ice to the affected area as tolerated for pain control.  It  was discussed that heating pads can cause burns.  5.  Low impact exercise such as walking or water exercise was recommended to maintain overall health and aid in weight control.   6.  Follow up as needed for subsequent injections.  7.  Patient was counseled to abstain from tobacco products.    Time :  19  min

## 2024-02-02 NOTE — ANESTHESIA PROCEDURE NOTES
PAIN Epidural block    Pre-sedation assessment completed: 2/2/2024 1:27 PM    Patient reassessed immediately prior to procedure    Patient location during procedure: pain clinic  Start Time: 2/2/2024 1:27 PM  Stop Time: 2/2/2024 1:36 PM  Indication:at surgeon's request and procedure for pain  Performed By  Anesthesiologist: Jean Paul Monzon MD  Preanesthetic Checklist  Completed: patient identified, IV checked, site marked, risks and benefits discussed, surgical consent, monitors and equipment checked, pre-op evaluation and timeout performed  Additional Notes  Dx:  Post-Op Diagnosis Codes:     * Lumbar radiculopathy [M54.16]  80 mg depomedrol in epid    Plan : return to clinic as needed      Sedation start:            1331                                   End:   1336  Prep:  Pt Position:prone (prone)  Sterile Tech:cap, gloves, mask and sterile barrier  Prep:chlorhexidine gluconate and isopropyl alcohol  Monitoring:blood pressure monitoring, EKG and continuous pulse oximetry  Procedure:Sedation: yes     Approach:midline  Guidance: fluoroscopy and c arm pa and lat and loss of resistance  Location:lumbar  Level:2-3 (interlaminar)  Needle Type:Jose  Needle Gauge:20  Aspiration:negative  Medications:  Preservative Free Saline:3mL  Depomedrol:80 mg  Post Assessment:  Pt Tolerance:patient tolerated the procedure well with no apparent complications  Complications:no

## 2024-03-04 ENCOUNTER — OFFICE VISIT (OUTPATIENT)
Dept: INTERNAL MEDICINE | Facility: CLINIC | Age: 39
End: 2024-03-04
Payer: COMMERCIAL

## 2024-03-04 VITALS
HEART RATE: 82 BPM | WEIGHT: 214 LBS | OXYGEN SATURATION: 98 % | BODY MASS INDEX: 33.59 KG/M2 | SYSTOLIC BLOOD PRESSURE: 140 MMHG | TEMPERATURE: 97.3 F | HEIGHT: 67 IN | DIASTOLIC BLOOD PRESSURE: 90 MMHG

## 2024-03-04 DIAGNOSIS — Z13.6 SCREENING FOR CARDIOVASCULAR CONDITION: ICD-10-CM

## 2024-03-04 DIAGNOSIS — Z13.0 SCREENING, ANEMIA, DEFICIENCY, IRON: ICD-10-CM

## 2024-03-04 DIAGNOSIS — Z13.1 SCREENING FOR DIABETES MELLITUS: ICD-10-CM

## 2024-03-04 DIAGNOSIS — Z00.00 ANNUAL PHYSICAL EXAM: Primary | ICD-10-CM

## 2024-03-04 DIAGNOSIS — Z12.5 SCREENING FOR PROSTATE CANCER: ICD-10-CM

## 2024-03-04 DIAGNOSIS — Z13.220 SCREENING, LIPID: ICD-10-CM

## 2024-03-04 DIAGNOSIS — Z11.59 NEED FOR HEPATITIS C SCREENING TEST: ICD-10-CM

## 2024-03-04 PROCEDURE — 99396 PREV VISIT EST AGE 40-64: CPT | Performed by: NURSE PRACTITIONER

## 2024-03-04 PROCEDURE — 93000 ELECTROCARDIOGRAM COMPLETE: CPT | Performed by: NURSE PRACTITIONER

## 2024-03-04 NOTE — PROGRESS NOTES
Procedure     ECG 12 Lead    Date/Time: 3/4/2024 3:38 PM  Performed by: Aisha Garcia APRN    Authorized by: Aisha Garcia APRN  Comparison: not compared with previous ECG   Previous ECG: no previous ECG available  Rhythm: sinus rhythm  Ectopy comments: none  Rate: normal  BPM: 71  Conduction: conduction normal  Conduction comments: PA 0.20 QRS 0.12  ST Segments: ST segments normal  T Waves: T waves normal  QRS axis: normal  Other findings: early repolarization    Clinical impression: normal ECG

## 2024-03-04 NOTE — PROGRESS NOTES
"Annual Exam      Boaz Valladares is being seen for a Complete physical exam. His last physical was unknown.     Social: He is working full time at [employer] [place] as . He is  to [place]. His household includes wife and 2 children.     Lifestyle: He is not exercising regularly, due to recent lumbar treatment. He does not use tobacco, but uses an herbal nicotine supplement. He drinks alcohol 5-6 beers.    Screening: Colonoscopy was completed never due to age.    History of Present Illness       The following portions of the patient's history were reviewed and updated as appropriate: allergies, current medications, past family history, past medical history, past social history, past surgical history, and problem list.    Review of Systems   Constitutional: Negative.    HENT: Negative.     Eyes: Negative.    Respiratory: Negative.     Cardiovascular: Negative.  Negative for chest pain, palpitations and leg swelling.   Endocrine: Negative.    Musculoskeletal:  Positive for arthralgias and back pain.   Allergic/Immunologic: Negative.    Neurological: Negative.    Hematological: Negative.  Negative for adenopathy. Does not bruise/bleed easily.   Psychiatric/Behavioral: Negative.     All other systems reviewed and are negative.      Objective          /90 (BP Location: Left arm, Patient Position: Sitting, Cuff Size: Large Adult)   Pulse 82   Temp 97.3 °F (36.3 °C) (Infrared)   Ht 170.2 cm (67.01\")   Wt 97.1 kg (214 lb)   SpO2 98%   BMI 33.51 kg/m²     General Appearance:    Alert, cooperative, no distress, appears stated age   Head:    Normocephalic, without obvious abnormality, atraumatic   Eyes:    PERRL, conjunctiva/corneas clear, EOM's intact, fundi     benign, both eyes        Ears:    Normal TM's and external ear canals, both ears   Nose:   Nares normal, septum midline, mucosa normal, no drainage    or sinus tenderness   Throat:   Lips, mucosa, and tongue normal; teeth and gums normal   Neck:   Supple, " symmetrical, trachea midline, no adenopathy;        thyroid:  No enlargement/tenderness/nodules; no carotid    bruit or JVD   Back:     Symmetric, no curvature, ROM normal, no CVA tenderness   Lungs:     Clear to auscultation bilaterally, respirations unlabored   Chest wall:    No tenderness or deformity   Heart:    Regular rate and rhythm, S1 and S2 normal, no murmur, rub    or gallop   Abdomen:     Soft, non-tender, bowel sounds active all four quadrants,     no masses, no organomegaly   Genitalia:    deferred   Rectal:    deferred   Extremities:   Extremities normal, atraumatic, no cyanosis or edema. Lumbar Decreased ROM   Pulses:   2+ and symmetric all extremities   Skin:   Skin color, texture, turgor normal, no rashes or lesions   Lymph nodes:   Cervical, supraclavicular, and axillary nodes normal   Neurologic:   CNII-XII intact. Normal strength, sensation and reflexes      throughout              Assessment & Plan   Diagnoses and all orders for this visit:    1. Annual physical exam (Primary)    2. Screening for cardiovascular condition  -     ECG 12 Lead    3. Screening, lipid  -     Lipid Panel With / Chol / HDL Ratio; Future    4. Screening for diabetes mellitus  -     Comprehensive Metabolic Panel; Future    5. Screening, anemia, deficiency, iron  -     CBC & Differential; Future    6. Screening for prostate cancer  -     PSA Screen; Future    7. Need for hepatitis C screening test  -     Hepatitis C Antibody; Future        Preventive counseling: Discussed alcohol intake, keep < 7 per week. Reviewed vaccine recommendations. Exercise inadequate due to back pain, discussed options for exercise with back pain.     Follow up after fasting labs, yearly CPE   (2) assistive person Vidhi

## 2024-03-11 ENCOUNTER — ANESTHESIA (OUTPATIENT)
Dept: PAIN MEDICINE | Facility: HOSPITAL | Age: 39
End: 2024-03-11
Payer: COMMERCIAL

## 2024-03-11 ENCOUNTER — ANESTHESIA EVENT (OUTPATIENT)
Dept: PAIN MEDICINE | Facility: HOSPITAL | Age: 39
End: 2024-03-11
Payer: COMMERCIAL

## 2024-03-11 ENCOUNTER — HOSPITAL ENCOUNTER (OUTPATIENT)
Dept: GENERAL RADIOLOGY | Facility: HOSPITAL | Age: 39
Discharge: HOME | End: 2024-03-11
Payer: COMMERCIAL

## 2024-03-11 ENCOUNTER — HOSPITAL ENCOUNTER (OUTPATIENT)
Dept: PAIN MEDICINE | Facility: HOSPITAL | Age: 39
Discharge: HOME | End: 2024-03-11
Payer: COMMERCIAL

## 2024-03-11 VITALS
DIASTOLIC BLOOD PRESSURE: 57 MMHG | RESPIRATION RATE: 24 BRPM | OXYGEN SATURATION: 95 % | SYSTOLIC BLOOD PRESSURE: 107 MMHG | TEMPERATURE: 98.2 F | HEART RATE: 69 BPM

## 2024-03-11 DIAGNOSIS — M54.16 LUMBAR RADICULOPATHY: ICD-10-CM

## 2024-03-11 DIAGNOSIS — R52 PAIN: ICD-10-CM

## 2024-03-11 PROCEDURE — 25010000002 METHYLPREDNISOLONE PER 80 MG: Performed by: STUDENT IN AN ORGANIZED HEALTH CARE EDUCATION/TRAINING PROGRAM

## 2024-03-11 PROCEDURE — 77003 FLUOROGUIDE FOR SPINE INJECT: CPT

## 2024-03-11 PROCEDURE — 25510000001 IOPAMIDOL 41 % SOLUTION: Performed by: STUDENT IN AN ORGANIZED HEALTH CARE EDUCATION/TRAINING PROGRAM

## 2024-03-11 RX ORDER — IOPAMIDOL 408 MG/ML
10 INJECTION, SOLUTION INTRATHECAL
Status: COMPLETED | OUTPATIENT
Start: 2024-03-11 | End: 2024-03-11

## 2024-03-11 RX ORDER — METHYLPREDNISOLONE ACETATE 80 MG/ML
80 INJECTION, SUSPENSION INTRA-ARTICULAR; INTRALESIONAL; INTRAMUSCULAR; SOFT TISSUE ONCE
Status: COMPLETED | OUTPATIENT
Start: 2024-03-11 | End: 2024-03-11

## 2024-03-11 RX ORDER — LIDOCAINE HYDROCHLORIDE 10 MG/ML
1 INJECTION, SOLUTION INFILTRATION; PERINEURAL ONCE
Status: DISCONTINUED | OUTPATIENT
Start: 2024-03-11 | End: 2024-03-12 | Stop reason: HOSPADM

## 2024-03-11 RX ADMIN — IOPAMIDOL 10 ML: 408 INJECTION, SOLUTION INTRATHECAL at 14:48

## 2024-03-11 RX ADMIN — METHYLPREDNISOLONE ACETATE 80 MG: 80 INJECTION, SUSPENSION INTRA-ARTICULAR; INTRALESIONAL; INTRAMUSCULAR; SOFT TISSUE at 14:48

## 2024-03-11 NOTE — DISCHARGE INSTRUCTIONS

## 2024-03-11 NOTE — ANESTHESIA PROCEDURE NOTES
PAIN Epidural block      Patient reassessed immediately prior to procedure    Patient location during procedure: pain clinic  Indication:procedure for pain  Performed By  Anesthesiologist: Haley Cabral MD  Preanesthetic Checklist  Completed: patient identified, risks and benefits discussed, surgical consent, monitors and equipment checked, pre-op evaluation and timeout performed  Additional Notes  Needle placement guided by fluoroscopy and confirmed with JADIEL and contrast injection.     Diagnosis:  Post-Op Diagnosis Codes:     * Lumbar radiculopathy [M54.16]    Sedation:  none  Sedation time:  Prep:  Pt Position:prone  Sterile Tech:cap, gloves, sterile barrier and mask  Prep:chlorhexidine gluconate and isopropyl alcohol  Monitoring:EKG, continuous pulse oximetry and blood pressure monitoring  Procedure:  Approach:left paramedian  Guidance: fluoroscopy  Location:lumbar  Level:4-5 (Lumbar Intralaminar)  Needle Type:Tuohy  Needle Gauge:20 G  Aspiration:negative  Medications:  Preservative Free Saline:3mL  Isovue:1mL  Depomedrol:80mg  Post Assessment:  Post-procedure: Bandaid.  Pt Tolerance:patient tolerated the procedure well with no apparent complications  Complications:no

## 2024-03-11 NOTE — H&P
INTERVAL HISTORY:    The patient returns for another Lumbar epidural steroid injection today.    They have received at least 90% improvement since their last injection.    Today their pain is a 2 out of 10 at rest and a 4 out of 10 with activity.  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 Aqua therapy, home stretching    No current outpatient medications on file prior to encounter.     No current facility-administered medications on file prior to encounter.       Past Medical History:   Diagnosis Date    Anxiety     Herniated lumbar intervertebral disc     Low back pain        No hematologic infectious or constitutional symptoms  Negative screen for JAMES      Exam:  /98 (BP Location: Left arm, Patient Position: Sitting)   Pulse 82   Temp 36.8 °C (98.2 °F) (Infrared)   Resp 14   SpO2 98%   Alert and oriented  NCAT  Unlabored respirations  Extremities WWP  Bilateral lower extremity strength intact    Diagnosis:  Post-Op Diagnosis Codes:     * Lumbar radiculopathy [M54.16]    Plan:  Lumbar 4-5 epidural steroid injection under fluoroscopic guidance    I have encouraged them to continue:  1.  Physical therapy exercises at home as prescribed by physical therapy or from the pain clinic handout.  Continuation of these exercises every day, or multiple times per week, even when the patient has good pain relief, was stressed to the patient as a preventative measure to decrease the frequency and severity of future pain episodes.  2.  Continue pain medicines as already prescribed.  If patient not currently taking any, it is recommended to begin Acetaminophen 1000 mg po q 8 hours.  If other medicines containing Acetaminophen are currently prescribed, maintain daily dose at 3000mg.    3.  If they can tolerate NSAIDS, it is recommended to take Ibuprofen  600 mg po q 6 hours for 7 days during pain exacerbations.   Alternatively, they may substitute an NSAID of their choice (e.g. Aleve)  4.  Heat and ice to the affected area as tolerated for pain control.   5.  Low impact exercise such as walking or water exercise was recommended to maintain overall health and aid in weight control.   6.  Follow up as needed for subsequent injections.

## 2024-05-28 ENCOUNTER — TELEPHONE (OUTPATIENT)
Dept: NEUROSURGERY | Facility: CLINIC | Age: 39
End: 2024-05-28
Payer: COMMERCIAL

## 2024-06-19 ENCOUNTER — ANESTHESIA EVENT (OUTPATIENT)
Dept: PAIN MEDICINE | Facility: HOSPITAL | Age: 39
End: 2024-06-19
Payer: COMMERCIAL

## 2024-06-20 ENCOUNTER — HOSPITAL ENCOUNTER (OUTPATIENT)
Dept: GENERAL RADIOLOGY | Facility: HOSPITAL | Age: 39
Discharge: HOME | End: 2024-06-20
Payer: COMMERCIAL

## 2024-06-20 ENCOUNTER — ANESTHESIA (OUTPATIENT)
Dept: PAIN MEDICINE | Facility: HOSPITAL | Age: 39
End: 2024-06-20
Payer: COMMERCIAL

## 2024-06-20 ENCOUNTER — HOSPITAL ENCOUNTER (OUTPATIENT)
Dept: PAIN MEDICINE | Facility: HOSPITAL | Age: 39
Discharge: HOME | End: 2024-06-20
Payer: COMMERCIAL

## 2024-06-20 VITALS
DIASTOLIC BLOOD PRESSURE: 81 MMHG | OXYGEN SATURATION: 97 % | SYSTOLIC BLOOD PRESSURE: 146 MMHG | RESPIRATION RATE: 16 BRPM | HEART RATE: 70 BPM | TEMPERATURE: 98 F

## 2024-06-20 DIAGNOSIS — M54.50 LUMBAR PAIN: ICD-10-CM

## 2024-06-20 DIAGNOSIS — M54.16 LUMBAR RADICULOPATHY: Primary | ICD-10-CM

## 2024-06-20 PROCEDURE — 25010000002 METHYLPREDNISOLONE PER 80 MG: Performed by: ANESTHESIOLOGY

## 2024-06-20 PROCEDURE — 25510000001 IOPAMIDOL 41 % SOLUTION: Performed by: ANESTHESIOLOGY

## 2024-06-20 PROCEDURE — 77003 FLUOROGUIDE FOR SPINE INJECT: CPT

## 2024-06-20 RX ORDER — IOPAMIDOL 408 MG/ML
3 INJECTION, SOLUTION INTRATHECAL
Status: COMPLETED | OUTPATIENT
Start: 2024-06-20 | End: 2024-06-20

## 2024-06-20 RX ORDER — MIDAZOLAM HYDROCHLORIDE 1 MG/ML
1 INJECTION INTRAMUSCULAR; INTRAVENOUS
Status: DISCONTINUED | OUTPATIENT
Start: 2024-06-20 | End: 2024-06-21 | Stop reason: HOSPADM

## 2024-06-20 RX ORDER — FENTANYL CITRATE 50 UG/ML
50 INJECTION, SOLUTION INTRAMUSCULAR; INTRAVENOUS AS NEEDED
Status: DISCONTINUED | OUTPATIENT
Start: 2024-06-20 | End: 2024-06-21 | Stop reason: HOSPADM

## 2024-06-20 RX ORDER — LIDOCAINE HYDROCHLORIDE 10 MG/ML
1 INJECTION, SOLUTION INFILTRATION; PERINEURAL ONCE
Status: DISCONTINUED | OUTPATIENT
Start: 2024-06-20 | End: 2024-06-21 | Stop reason: HOSPADM

## 2024-06-20 RX ORDER — METHYLPREDNISOLONE ACETATE 80 MG/ML
80 INJECTION, SUSPENSION INTRA-ARTICULAR; INTRALESIONAL; INTRAMUSCULAR; SOFT TISSUE ONCE
Status: COMPLETED | OUTPATIENT
Start: 2024-06-20 | End: 2024-06-20

## 2024-06-20 RX ADMIN — IOPAMIDOL 10 ML: 408 INJECTION, SOLUTION INTRATHECAL at 14:41

## 2024-06-20 RX ADMIN — METHYLPREDNISOLONE ACETATE 80 MG: 80 INJECTION, SUSPENSION INTRA-ARTICULAR; INTRALESIONAL; INTRAMUSCULAR; SOFT TISSUE at 14:41

## 2024-06-20 NOTE — DISCHARGE INSTRUCTIONS

## 2024-06-20 NOTE — H&P
INTERVAL HISTORY:    The patient returns for another Lumbar epidural steroid injection 3 today.  They have received 80% improvement since their last injection with a pain level of 3/10 at its worst recently.    Conservative measures tried in the interim. Daily activities are still affected by the pain.    Radiology reports and/or previous notes have been reviewed and are consistent with their diagnosis of Post-Op Diagnosis Codes:     * Post-dural puncture headache [G97.1]    Alert and oriented  MP - 2  Lungs - clear  CV - rrr    Diagnosis:  Post-Op Diagnosis Codes:     * Post-dural puncture headache [G97.1]      Plan:  Lumbar epidural steroid injection under fluoroscopic guidance        Target : L4-5    I have encouraged them to continue:  1.  Physical therapy exercises at home as prescribed by physical therapy or from the pain clinic handout (given to the patient).  Continuation of these exercises every day, or multiple times per week, even when the patient has good pain relief, was stressed to the patient as a preventative measure to decrease the frequency and severity of future pain episodes.  2.  Continue pain medicines as already prescribed.  If patient not currently taking any, it is recommended to begin Acetaminophen 1000 mg po q 8 hours.  If other medicines containing Acetaminophen are currently prescribed, maintain daily dose at 3000mg.    3.  If they can tolerate NSAIDS, it is recommended to take Ibuprofen 600 mg po q 6 hours for 7 days during pain exacerbations.   Alternatively, they may substitute an NSAID of their choice (e.g. Aleve)  4.  Heat and ice to the affected area as tolerated for pain control.  It was discussed that heating pads can cause burns.  5.  Low impact exercise such as walking or water exercise was recommended to maintain overall health and aid in weight control.   6.  Follow up as needed for subsequent injections.  7.  Patient was counseled to abstain from tobacco products.    Time :  16   min

## 2024-06-20 NOTE — ANESTHESIA PROCEDURE NOTES
PAIN Epidural block    Pre-sedation assessment completed: 6/20/2024 2:38 PM    Patient reassessed immediately prior to procedure    Patient location during procedure: pain clinic  Start Time: 6/20/2024 2:38 PM  Stop Time: 6/20/2024 2:47 PM  Indication:at surgeon's request and procedure for pain  Performed By  Anesthesiologist: Jean Paul Monzon MD  Preanesthetic Checklist  Completed: patient identified, IV checked, site marked, risks and benefits discussed, surgical consent, monitors and equipment checked, pre-op evaluation and timeout performed  Additional Notes  Dx:  Post-Op Diagnosis Codes:     * Post-dural puncture headache [G97.1]            Plan : return to clinic as needed      Sedation start:                                                  End:  Prep:  Pt Position:prone (prone)  Sterile Tech:cap, gloves, mask and sterile barrier  Prep:chlorhexidine gluconate and isopropyl alcohol  Monitoring:blood pressure monitoring, EKG and continuous pulse oximetry  Procedure:Sedation: no     Approach:midline  Guidance: fluoroscopy and c arm pa and lat and loss of resistance  Location:lumbar  Level:4-5 (interlaminar)  Needle Type:Hustead  Needle Gauge:20  Aspiration:negative  Medications:  Preservative Free Saline:3mL  Comments:80 mg depomedrol in epidDepomedrol:80 mg  Post Assessment:  Pt Tolerance:patient tolerated the procedure well with no apparent complications  Complications:no

## 2024-09-25 ENCOUNTER — OFFICE VISIT (OUTPATIENT)
Dept: NEUROSURGERY | Facility: CLINIC | Age: 39
End: 2024-09-25
Payer: COMMERCIAL

## 2024-09-25 VITALS
DIASTOLIC BLOOD PRESSURE: 78 MMHG | BODY MASS INDEX: 33.59 KG/M2 | SYSTOLIC BLOOD PRESSURE: 148 MMHG | WEIGHT: 214 LBS | RESPIRATION RATE: 20 BRPM | HEIGHT: 67 IN

## 2024-09-25 DIAGNOSIS — M54.42 CHRONIC MIDLINE LOW BACK PAIN WITH LEFT-SIDED SCIATICA: ICD-10-CM

## 2024-09-25 DIAGNOSIS — M51.369 DDD (DEGENERATIVE DISC DISEASE), LUMBAR: Primary | ICD-10-CM

## 2024-09-25 DIAGNOSIS — G89.29 CHRONIC MIDLINE LOW BACK PAIN WITH LEFT-SIDED SCIATICA: ICD-10-CM

## 2024-09-25 DIAGNOSIS — M51.16 HERNIATION OF LUMBAR INTERVERTEBRAL DISC WITH RADICULOPATHY: ICD-10-CM

## 2025-01-14 NOTE — PROGRESS NOTES
Subjective   Patient ID: Boaz Valladares is a 39 y.o. male is here today for follow-up regarding his DDD (degenerative disc disease), lumbar     This gentleman is with his wife.  I last saw him about 4 months ago.  I had seen him in the past when he was a teenager.  He has a long history of low back issues and more recently had a left L2-L3 extrusion which caused severe anterior thigh pain.  He did get better eventually with time and with injections that he got last year.  He has a very infrequently now and he has no motor deficits.  He continues to work at a somewhat physical job.  He has more low back pain, mechanical in nature, him more than anything else.  He wanted to know if he could have some epidural injections but I told him I thought that of radiofrequency ablation would probably be a better option for him since it is mostly facet based pain..  We will send him to Dr. Alegria at the pain clinic here for this and see him in about 7 months with x-rays.  If he develops recurrent anterior thigh pain or other forms of sciatica, he will let us know.      History of Present Illness    The following portions of the patient's history were reviewed and updated as appropriate: allergies, current medications, past family history, past medical history, past social history, past surgical history, and problem list.    Review of Systems   Constitutional:  Negative for fever.   Musculoskeletal:  Negative for back pain and gait problem.   Neurological:  Negative for weakness and numbness.   All other systems reviewed and are negative.      Objective   Physical Exam  Constitutional:       General: He is awake.      Appearance: He is well-developed.   HENT:      Head: Normocephalic and atraumatic.   Eyes:      General: Lids are normal.      Extraocular Movements: Extraocular movements intact.      Conjunctiva/sclera: Conjunctivae normal.      Pupils: Pupils are equal, round, and reactive to light.   Neck:      Vascular: No  carotid bruit.   Neurological:      Mental Status: He is alert.      Coordination: Coordination is intact.      Deep Tendon Reflexes:      Reflex Scores:       Tricep reflexes are 2+ on the right side and 2+ on the left side.       Bicep reflexes are 2+ on the right side and 2+ on the left side.       Brachioradialis reflexes are 2+ on the right side and 2+ on the left side.       Patellar reflexes are 2+ on the right side and 2+ on the left side.       Achilles reflexes are 2+ on the right side and 2+ on the left side.  Psychiatric:         Speech: Speech normal.       Neurological Exam  Mental Status  Awake and alert. Oriented only to person, place, time and situation. Recent and remote memory are intact. Speech is normal. Language is fluent with no aphasia. Attention and concentration are normal. Fund of knowledge is appropriate for level of education.    Cranial Nerves  CN II: Visual acuity is normal. Visual fields full to confrontation.  CN III, IV, VI: Extraocular movements intact bilaterally. Normal lids and orbits bilaterally. Pupils equal round and reactive to light bilaterally.  CN V: Facial sensation is normal.  CN VII: Full and symmetric facial movement.  CN IX, X: Palate elevates symmetrically. Normal gag reflex.  CN XI: Shoulder shrug strength is normal.  CN XII: Tongue midline without atrophy or fasciculations.    Motor  Normal muscle bulk throughout. Normal muscle tone.                                               Right                     Left  Rhomboids                            5                          5  Infraspinatus                          5                          5  Supraspinatus                       5                          5  Deltoid                                   5                          5   Biceps                                   5                          5  Brachioradialis                      5                          5   Triceps                                  5                           5   Pronator                                5                          5   Supinator                              5                           5   Wrist flexor                            5                          5   Wrist extensor                       5                          5   Finger flexor                          5                          5   Finger extensor                     5                          5   Interossei                              5                          5   Abductor pollicis brevis         5                          5   Flexor pollicis brevis             5                          5   Opponens pollicis                 5                          5  Extensor digitorum               5                          5  Abductor digiti minimi           5                          5   Abdominal                            5                          5  Glutei                                    5                          5  Hip abductor                         5                          5  Hip adductor                         5                          5   Iliopsoas                               5                          5   Quadriceps                           5                          5   Hamstring                             5                          5   Gastrocnemius                     5                           5   Anterior tibialis                      5                          5   Posterior tibialis                    5                          5   Peroneal                               5                          5  Ankle dorsiflexor                   5                          5  Ankle plantar flexor              5                           5  Extensor hallucis longus      5                           5    Sensory  Light touch is normal in upper and lower extremities. Proprioception is normal in upper and lower extremities.     Reflexes                                             Right                      Left  Brachioradialis                    2+                         2+  Biceps                                 2+                         2+  Triceps                                2+                         2+  Finger flex                           2+                         2+  Hamstring                            2+                         2+  Patellar                                2+                         2+  Achilles                                2+                         2+    Coordination    Finger-to-nose, rapid alternating movements and heel-to-shin normal bilaterally without dysmetria.    Gait  Casual gait is normal including stance, stride, and arm swing.Normal toe walking. Normal heel walking. Normal tandem gait.       Assessment & Plan   Independent Review of Radiographic Studies:      The MRI done on 1/23/2024 shows a very large disc extrusion at L2-L3 mostly towards the left compressing the left L3 nerve root quite significantly.  At L4-L5 there is a left subarticular extrusion that measures 11 x 8 x 8 mm with severe superimposed canal stenosis.  Agree with the report.     Medical Decision Making:      Will send him to see Dr. Alegria at the pain clinic for lumbar medial branch blocks and a possible radiofrequency ablation.  Will see him in 7 months with x-rays.  If he has a severe recurrence of sciatica, he will let us know.      Diagnoses and all orders for this visit:    1. Degeneration of intervertebral disc of lumbar region with discogenic back pain and lower extremity pain (Primary)  -     XR Spine Lumbar Complete With Flex & Ext; Future    2. Herniation of lumbar intervertebral disc with radiculopathy  -     XR Spine Lumbar Complete With Flex & Ext; Future    3. Chronic midline low back pain with left-sided sciatica  -     XR Spine Lumbar Complete With Flex & Ext; Future    4. Lumbar facet joint syndrome  -     XR Spine Lumbar Complete With Flex & Ext;  Future  -     Ambulatory Referral to Hospital Pain Management Department      Return in about 7 months (around 8/20/2025) for face to face.

## 2025-01-20 ENCOUNTER — OFFICE VISIT (OUTPATIENT)
Dept: NEUROSURGERY | Facility: CLINIC | Age: 40
End: 2025-01-20
Payer: COMMERCIAL

## 2025-01-20 VITALS
DIASTOLIC BLOOD PRESSURE: 78 MMHG | HEIGHT: 67 IN | SYSTOLIC BLOOD PRESSURE: 152 MMHG | WEIGHT: 200 LBS | BODY MASS INDEX: 31.39 KG/M2 | RESPIRATION RATE: 20 BRPM

## 2025-01-20 DIAGNOSIS — M54.42 CHRONIC MIDLINE LOW BACK PAIN WITH LEFT-SIDED SCIATICA: ICD-10-CM

## 2025-01-20 DIAGNOSIS — M51.362 DEGENERATION OF INTERVERTEBRAL DISC OF LUMBAR REGION WITH DISCOGENIC BACK PAIN AND LOWER EXTREMITY PAIN: Primary | ICD-10-CM

## 2025-01-20 DIAGNOSIS — M51.16 HERNIATION OF LUMBAR INTERVERTEBRAL DISC WITH RADICULOPATHY: ICD-10-CM

## 2025-01-20 DIAGNOSIS — G89.29 CHRONIC MIDLINE LOW BACK PAIN WITH LEFT-SIDED SCIATICA: ICD-10-CM

## 2025-01-20 DIAGNOSIS — M47.816 LUMBAR FACET JOINT SYNDROME: ICD-10-CM

## 2025-01-20 PROCEDURE — 99213 OFFICE O/P EST LOW 20 MIN: CPT | Performed by: NEUROLOGICAL SURGERY

## 2025-02-26 ENCOUNTER — TELEPHONE (OUTPATIENT)
Dept: INTERNAL MEDICINE | Facility: CLINIC | Age: 40
End: 2025-02-26
Payer: COMMERCIAL

## 2025-02-26 NOTE — TELEPHONE ENCOUNTER
OK FOR HUB TO READ!  Patient had called and LVM this morning stating that he needed to reschedule his lab appt. I tried calling him back but has VM that is full.

## 2025-03-06 ENCOUNTER — ANESTHESIA EVENT (OUTPATIENT)
Dept: PAIN MEDICINE | Facility: HOSPITAL | Age: 40
End: 2025-03-06

## 2025-03-06 ENCOUNTER — ANESTHESIA (OUTPATIENT)
Dept: PAIN MEDICINE | Facility: HOSPITAL | Age: 40
End: 2025-03-06

## 2025-03-06 ENCOUNTER — HOSPITAL ENCOUNTER (OUTPATIENT)
Dept: PAIN MEDICINE | Facility: HOSPITAL | Age: 40
Discharge: HOME | End: 2025-03-06
Admitting: ANESTHESIOLOGY
Payer: COMMERCIAL

## 2025-03-06 DIAGNOSIS — M47.816 LUMBAR FACET JOINT SYNDROME: Primary | ICD-10-CM

## 2025-03-06 PROCEDURE — G0463 HOSPITAL OUTPT CLINIC VISIT: HCPCS

## 2025-03-06 NOTE — H&P
[facility]    History and Physical    Patient Name: Boaz Valladares  :  1985  MRN:  [id number]  Date of Admission: 3/6/2025    Subjective     Patient is a 40 y.o. male presents with chief complaint of chronic low back pain.  Onset of symptoms was gradual starting several months ago.  Symptoms are associated/aggravated by nothing in particular, activity, or lifting. Symptoms improve with rest    The following portions of the patients history were reviewed and updated as appropriate: current medications, allergies, past medical history, past surgical history, past family history, past social history, and problem list  He reports low back pain which is primarily axial in nature.  It goes around his waist and sits on top of his buttocks.  He previously had had radicular symptoms down his left leg that responded pretty well to epidural steroid injections.  His back pain is continued however.  Pain is exacerbated by forward flexion and rotation.  Lateral bending and extension do not seem to bother him very much.  He still occasionally is radicular symptoms when his back locks up but he does not normally have that.    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      His MRI of his lumbar spine which shows facet arthropathy at L2-3, L3-4 and L5-S1.  At L2-3 he has a large disc that causes central canal stenosis and is in contact with the L3 nerve root.  At L4-5 he has a large disc also causing central canal stenosis.    Neurosurgery is requested we evaluate him for possible medial branch blocks in the hopes of being able to perform radiofrequency neurotomy to help his axial pain.              Objective     Past Medical History:   Past Medical History:   Diagnosis Date   • Anxiety    • Herniated lumbar intervertebral disc    • Low back pain      Past Surgical History:   Past Surgical History:   Procedure Laterality Date   • EPIDURAL BLOCK       Family  History:   Family History   Problem Relation Age of Onset   • Hypertension Mother    • Melanoma Father    • No Known Problems Brother    • No Known Problems Maternal Grandmother    • Rectal cancer Maternal Grandfather    • COPD Paternal Grandmother    • Heart disease Paternal Grandfather    • Heart attack Paternal Grandfather    • No Known Problems Daughter    • No Known Problems Son    • No Known Problems Brother    • No Known Problems Brother      Social History:   Social History     Socioeconomic History   • Marital status:    Tobacco Use   • Smoking status: Never     Passive exposure: Never   • Smokeless tobacco: Never   Vaping Use   • Vaping status: Never Used   Substance and Sexual Activity   • Alcohol use: Yes     Alcohol/week: 5.0 - 6.0 standard drinks of alcohol     Types: 5 - 6 Cans of beer per week   • Drug use: No   • Sexual activity: Yes     Partners: Female       Vital Signs Range for the last 24 hours  Temperature:     Temp Source:     BP:     Pulse:     Respirations:     SPO2:     O2 Amount (l/min):     O2 Devices     Weight:           --------------------------------------------------------------------------------    Current Outpatient Medications   Medication Sig Dispense Refill   • fluticasone (FLONASE) 50 MCG/ACT nasal spray Administer 2 sprays into the nostril(s) as directed by provider Daily. 16 g 0     No current facility-administered medications for this encounter.       --------------------------------------------------------------------------------  Assessment & Plan      Anesthesia Evaluation     NPO Solid Status: > 8 hours      Pain impairs ability to perform ADLs: Ambulation, Exercise/Activity and Working  Modalities previously tried to control pain with limited effectiveness within the last 4-6 weeks: Rest and OTC medications     Airway   Mallampati: II  TM distance: >3 FB  Neck ROM: full  No difficulty expected  Dental      Pulmonary    (-) wheezes  Cardiovascular     Rhythm:  regular    (-) murmur      Neuro/Psych  (-) normal sensory deficit, left straight leg raise test, right straight leg raise test  GI/Hepatic/Renal/Endo      Musculoskeletal         PE comment: Station gait is narrow-based and steady  Is able to stand on his toes and his heels.  There is no weakness of his quadriceps hamstrings plantar or dorsi flexors.  There is no tenderness over the sacroiliac joint.  Vince maneuver is negative bilaterally    Rotation in both directions causes him pain as does forward flexion.    Abdominal    Substance History      OB/GYN          Other                 Diagnosis and Plan    Treatment Plan  ASA 2      Procedures: Nerve block type: Bilateral medial branch blocks at the transverse processes of L3, L4, L5 and the sacral ala., With fluoroscopy,      Anesthetic plan and risks discussed with patient.      Dr. Stratton has requested we evaluate him for possible radiofrequency ablation.  I spoke with him at length about his condition.  He does not have much in the way of radicular symptoms at this time.  MRI did show some spinal stenosis due to large disc extrusions.  He does have some facet arthropathy.  I showed him a model of the spine and explained to him how medial branch blocks will only affect pain within the facet joints and will not affect any pain that is radicular in nature due to facet hypertrophy or due to disc osteophyte complex.  Stands this would not help any pain that is radiating down his leg or any radicular pain.  He has minimal to no radicular pain at this time.  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  He understands that we would have to do a trial of medial branch blocks twice and he needs to get 80% relief of his discomfort.  He understands radiofrequency neurotomy is not permanent and can be expected to be repeated in 6 months to 12 months.  He would like to go home and consider his options.  He will call us if he  wishes to trial medial branch blocks  Diagnosis     * Lumbar spondylosis [M47.816]     * Connective tissue and disc stenosis of intervertebral foramina of lumbar region [M99.73]

## 2025-03-06 NOTE — NURSING NOTE
PT HERE FOR CONSULT WITH DR VARGHESE FOR MBB/RFA, DR VARGHESE IN TO CONSULT PTQUEBEC BACK PAIN DISABILITY SACLE COMPLETED BYPT

## 2025-06-07 ENCOUNTER — HOSPITAL ENCOUNTER (EMERGENCY)
Facility: HOSPITAL | Age: 40
Discharge: HOME | End: 2025-06-07
Attending: EMERGENCY MEDICINE
Payer: COMMERCIAL

## 2025-06-07 ENCOUNTER — APPOINTMENT (OUTPATIENT)
Dept: CT IMAGING | Facility: HOSPITAL | Age: 40
End: 2025-06-07
Payer: COMMERCIAL

## 2025-06-07 VITALS
SYSTOLIC BLOOD PRESSURE: 150 MMHG | HEART RATE: 50 BPM | RESPIRATION RATE: 16 BRPM | TEMPERATURE: 98.3 F | WEIGHT: 200 LBS | BODY MASS INDEX: 31.39 KG/M2 | DIASTOLIC BLOOD PRESSURE: 85 MMHG | HEIGHT: 67 IN | OXYGEN SATURATION: 97 %

## 2025-06-07 DIAGNOSIS — M54.9 MUSCULOSKELETAL BACK PAIN: Primary | ICD-10-CM

## 2025-06-07 LAB
ALBUMIN SERPL-MCNC: 4.2 G/DL (ref 3.5–5.2)
ALBUMIN/GLOB SERPL: 1.6 G/DL
ALP SERPL-CCNC: 55 U/L (ref 39–117)
ALT SERPL W P-5'-P-CCNC: 25 U/L (ref 1–41)
ANION GAP SERPL CALCULATED.3IONS-SCNC: 10.6 MMOL/L (ref 5–15)
AST SERPL-CCNC: 17 U/L (ref 1–40)
BACTERIA UR QL AUTO: NORMAL /HPF
BASOPHILS # BLD AUTO: 0.04 10*3/MM3 (ref 0–0.2)
BASOPHILS NFR BLD AUTO: 0.5 % (ref 0–1.5)
BILIRUB SERPL-MCNC: 0.4 MG/DL (ref 0–1.2)
BILIRUB UR QL STRIP: NEGATIVE
BUN SERPL-MCNC: 12.4 MG/DL (ref 6–20)
BUN/CREAT SERPL: 13.1 (ref 7–25)
CALCIUM SPEC-SCNC: 8.8 MG/DL (ref 8.6–10.5)
CHLORIDE SERPL-SCNC: 103 MMOL/L (ref 98–107)
CLARITY UR: CLEAR
CO2 SERPL-SCNC: 24.4 MMOL/L (ref 22–29)
COLOR UR: YELLOW
CREAT SERPL-MCNC: 0.95 MG/DL (ref 0.76–1.27)
DEPRECATED RDW RBC AUTO: 37.2 FL (ref 37–54)
EGFRCR SERPLBLD CKD-EPI 2021: 103.1 ML/MIN/1.73
EOSINOPHIL # BLD AUTO: 0.09 10*3/MM3 (ref 0–0.4)
EOSINOPHIL NFR BLD AUTO: 1.1 % (ref 0.3–6.2)
ERYTHROCYTE [DISTWIDTH] IN BLOOD BY AUTOMATED COUNT: 11.7 % (ref 12.3–15.4)
GLOBULIN UR ELPH-MCNC: 2.6 GM/DL
GLUCOSE SERPL-MCNC: 146 MG/DL (ref 65–99)
GLUCOSE UR STRIP-MCNC: NEGATIVE MG/DL
HCT VFR BLD AUTO: 46.4 % (ref 37.5–51)
HGB BLD-MCNC: 15.6 G/DL (ref 13–17.7)
HGB UR QL STRIP.AUTO: ABNORMAL
HYALINE CASTS UR QL AUTO: NORMAL /LPF
IMM GRANULOCYTES # BLD AUTO: 0.01 10*3/MM3 (ref 0–0.05)
IMM GRANULOCYTES NFR BLD AUTO: 0.1 % (ref 0–0.5)
KETONES UR QL STRIP: NEGATIVE
LEUKOCYTE ESTERASE UR QL STRIP.AUTO: NEGATIVE
LYMPHOCYTES # BLD AUTO: 2.66 10*3/MM3 (ref 0.7–3.1)
LYMPHOCYTES NFR BLD AUTO: 32.3 % (ref 19.6–45.3)
MCH RBC QN AUTO: 28.6 PG (ref 26.6–33)
MCHC RBC AUTO-ENTMCNC: 33.6 G/DL (ref 31.5–35.7)
MCV RBC AUTO: 85.1 FL (ref 79–97)
MONOCYTES # BLD AUTO: 0.57 10*3/MM3 (ref 0.1–0.9)
MONOCYTES NFR BLD AUTO: 6.9 % (ref 5–12)
NEUTROPHILS NFR BLD AUTO: 4.86 10*3/MM3 (ref 1.7–7)
NEUTROPHILS NFR BLD AUTO: 59.1 % (ref 42.7–76)
NITRITE UR QL STRIP: NEGATIVE
PH UR STRIP.AUTO: 7 [PH] (ref 4.5–8)
PLATELET # BLD AUTO: 225 10*3/MM3 (ref 140–450)
PMV BLD AUTO: 9.2 FL (ref 6–12)
POTASSIUM SERPL-SCNC: 4.2 MMOL/L (ref 3.5–5.2)
PROT SERPL-MCNC: 6.8 G/DL (ref 6–8.5)
PROT UR QL STRIP: NEGATIVE
RBC # BLD AUTO: 5.45 10*6/MM3 (ref 4.14–5.8)
RBC # UR STRIP: NORMAL /HPF
REF LAB TEST METHOD: NORMAL
SODIUM SERPL-SCNC: 138 MMOL/L (ref 136–145)
SP GR UR STRIP: 1.01 (ref 1–1.03)
SQUAMOUS #/AREA URNS HPF: NORMAL /HPF
UROBILINOGEN UR QL STRIP: ABNORMAL
WBC # UR STRIP: NORMAL /HPF
WBC NRBC COR # BLD AUTO: 8.23 10*3/MM3 (ref 3.4–10.8)

## 2025-06-07 PROCEDURE — 85025 COMPLETE CBC W/AUTO DIFF WBC: CPT | Performed by: EMERGENCY MEDICINE

## 2025-06-07 PROCEDURE — 80053 COMPREHEN METABOLIC PANEL: CPT | Performed by: EMERGENCY MEDICINE

## 2025-06-07 PROCEDURE — 96374 THER/PROPH/DIAG INJ IV PUSH: CPT

## 2025-06-07 PROCEDURE — 99284 EMERGENCY DEPT VISIT MOD MDM: CPT | Performed by: EMERGENCY MEDICINE

## 2025-06-07 PROCEDURE — 25010000002 FENTANYL CITRATE (PF) 50 MCG/ML SOLUTION: Performed by: EMERGENCY MEDICINE

## 2025-06-07 PROCEDURE — 74176 CT ABD & PELVIS W/O CONTRAST: CPT

## 2025-06-07 PROCEDURE — 96375 TX/PRO/DX INJ NEW DRUG ADDON: CPT

## 2025-06-07 PROCEDURE — 25010000002 KETOROLAC TROMETHAMINE PER 15 MG: Performed by: EMERGENCY MEDICINE

## 2025-06-07 PROCEDURE — 81001 URINALYSIS AUTO W/SCOPE: CPT | Performed by: EMERGENCY MEDICINE

## 2025-06-07 RX ORDER — TRAMADOL HYDROCHLORIDE 50 MG/1
TABLET ORAL
Status: COMPLETED
Start: 2025-06-07 | End: 2025-06-07

## 2025-06-07 RX ORDER — KETOROLAC TROMETHAMINE 30 MG/ML
15 INJECTION, SOLUTION INTRAMUSCULAR; INTRAVENOUS ONCE
Status: COMPLETED | OUTPATIENT
Start: 2025-06-07 | End: 2025-06-07

## 2025-06-07 RX ORDER — TRAMADOL HYDROCHLORIDE 50 MG/1
50 TABLET ORAL ONCE
Status: COMPLETED | OUTPATIENT
Start: 2025-06-07 | End: 2025-06-07

## 2025-06-07 RX ORDER — FENTANYL CITRATE 50 UG/ML
1 INJECTION, SOLUTION INTRAMUSCULAR; INTRAVENOUS ONCE
Refills: 0 | Status: COMPLETED | OUTPATIENT
Start: 2025-06-07 | End: 2025-06-07

## 2025-06-07 RX ORDER — TRAMADOL HYDROCHLORIDE 50 MG/1
50 TABLET ORAL EVERY 6 HOURS PRN
Qty: 20 TABLET | Refills: 0 | Status: SHIPPED | OUTPATIENT
Start: 2025-06-07 | End: 2025-06-13

## 2025-06-07 RX ORDER — CYCLOBENZAPRINE HCL 10 MG
10 TABLET ORAL 3 TIMES DAILY PRN
Qty: 30 TABLET | Refills: 0 | Status: SHIPPED | OUTPATIENT
Start: 2025-06-07

## 2025-06-07 RX ORDER — METHYLPREDNISOLONE 4 MG/1
TABLET ORAL
Qty: 21 TABLET | Refills: 0 | Status: SHIPPED | OUTPATIENT
Start: 2025-06-07 | End: 2025-06-13

## 2025-06-07 RX ADMIN — KETOROLAC TROMETHAMINE 15 MG: 30 INJECTION, SOLUTION INTRAMUSCULAR; INTRAVENOUS at 10:15

## 2025-06-07 RX ADMIN — TRAMADOL HYDROCHLORIDE 50 MG: 50 TABLET ORAL at 14:33

## 2025-06-07 RX ADMIN — FENTANYL CITRATE 90.5 MCG: 50 INJECTION, SOLUTION INTRAMUSCULAR; INTRAVENOUS at 11:38

## 2025-06-07 NOTE — ED NOTES
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 Patient was given his Tramadol at discharge. Patient was offered a wheelchair, but refused. Patient and his spouse had no other questions at this time. Patient and his spouse exited the emergency department.

## 2025-06-07 NOTE — DISCHARGE INSTRUCTIONS
Take the Medrol Dosepak as prescribed.  Flexeril as needed as prescribed for pain.  Take the Ultram as needed as prescribed for pain.  Call Aisha Doshi Monday for follow-up appointment within 1 week.  Ice the tender area of the back for 20 minutes every 2-3 hours while awake for 2 to 3 days and then apply moist heat for 20 minutes every 2-3 hours for 2 to 3 days.  Return to the emergency department if there is increased pain, numbness, tingling, weakness, change in bladder or bowel function, worse in any way at all.

## 2025-06-07 NOTE — Clinical Note
[facility] EMERGENCY DEPARTMENT  [address]  [place] [address]  Phone: [phone]    Boaz Valladares was seen and treated in our emergency department on 6/7/2025.  He may return to work on 06/09/2025.         Thank you for choosing [facility].    Tk Neumann MD

## 2025-06-07 NOTE — ED PROVIDER NOTES
"Subjective   History of Present Illness    Chief complaint: Flank pain    Location: Left    Quality/Severity: 6/10 at its worst, 4/10 currently, sharp    Timing/Onset/Duration: Last night    Modifying Factors: Proved with ibuprofen, not worse with movement    Associated Symptoms: No headache.  No fever.  Patient has slight chills here in the emergency department.  No cough or sore throat or earache.  Patient has nasal congestion, he attributes this to allergies.  He states his throat is minimally scratchy.  No chest pain or shortness of breath.  No diarrhea or burning on urination.  No numbness, tingling, weakness, or change in bladder or bowel function.    Narrative: This 40-year-old presents with left flank pain for the last several hours.  Patient had some nausea.  No history of kidney stones.  Patient states he has a \"bad back\" but this pain is a different quality.    PCP:Aisha Garcia APRN      Review of Systems   Constitutional:  Positive for chills. Negative for fever.   HENT:  Positive for congestion and sore throat. Negative for ear pain.    Respiratory:  Negative for cough and shortness of breath.    Cardiovascular:  Negative for chest pain.   Gastrointestinal:  Negative for abdominal pain, nausea and vomiting.   Genitourinary:  Positive for flank pain (Left, radiating to the left groin).   Musculoskeletal:  Negative for back pain.   Skin:  Negative for rash.   Neurological:  Negative for headaches.       Past Medical History:   Diagnosis Date   • Anxiety    • Herniated lumbar intervertebral disc    • Low back pain        No Known Allergies    Past Surgical History:   Procedure Laterality Date   • EPIDURAL BLOCK         Family History   Problem Relation Age of Onset   • Hypertension Mother    • Melanoma Father    • No Known Problems Brother    • No Known Problems Maternal Grandmother    • Rectal cancer Maternal Grandfather    • COPD Paternal Grandmother    • Heart disease Paternal Grandfather    • Heart " attack Paternal Grandfather    • No Known Problems Daughter    • No Known Problems Son    • No Known Problems Brother    • No Known Problems Brother        Social History     Socioeconomic History   • Marital status:    Tobacco Use   • Smoking status: Never     Passive exposure: Never   • Smokeless tobacco: Never   Vaping Use   • Vaping status: Never Used   Substance and Sexual Activity   • Alcohol use: Yes     Alcohol/week: 5.0 - 6.0 standard drinks of alcohol     Types: 5 - 6 Cans of beer per week   • Drug use: No   • Sexual activity: Yes     Partners: Female           Objective   Physical Exam  Vitals (The temperature is 98.3 °F, pulse 70, respirations 16, /82, room air pulse ox 98%.) and nursing note reviewed.   Constitutional:       Appearance: Normal appearance.   HENT:      Head: Normocephalic and atraumatic.      Right Ear: Tympanic membrane normal.      Left Ear: Tympanic membrane normal.   Cardiovascular:      Rate and Rhythm: Normal rate and regular rhythm.      Pulses: Normal pulses.      Heart sounds: Normal heart sounds. No murmur heard.     No friction rub. No gallop.   Pulmonary:      Effort: Pulmonary effort is normal.      Breath sounds: Normal breath sounds.   Abdominal:      General: Abdomen is flat. Bowel sounds are normal. There is no distension.      Palpations: Abdomen is soft. There is no mass.      Tenderness: There is no abdominal tenderness. There is no right CVA tenderness, left CVA tenderness, guarding or rebound.      Hernia: No hernia is present.   Genitourinary:     Penis: Normal.       Testes: Normal.   Musculoskeletal:         General: No swelling or tenderness. Normal range of motion.      Cervical back: Normal range of motion and neck supple.   Skin:     General: Skin is warm and dry.      Findings: No rash.   Neurological:      General: No focal deficit present.      Mental Status: He is alert and oriented to person, place, and time.      Sensory: No sensory  deficit.      Motor: No weakness.       Procedures           ED Course  ED Course as of 06/07/25 1121   Sat Jun 07, 2025   1031 The CBC is unremarkable. [RC]   1121 Urinalysis shows 0-2 RBCs, no WBCs, no bacteria, trace blood, leukocyte negative, nitrite negative.  Urinalysis is otherwise unremarkable. [RC]   1121 Glucose is 146, the CMP is otherwise unremarkable. [RC]      ED Course User Index  [RC] Tk Neumann MD        11:22 EDT, 06/07/25:  The patient states that ketorolac did not do anything for his pain.  I will give the patient some fentanyl.    14:00 EDT, 06/07/25:  Patient was reassessed.  He feels better.  His vital signs were reviewed and are stable.  Neurologic exam: Conscious alert oriented x 4 with no focal deficits noted.  Abdominal exam: Soft, nontender, no masses, positive bowel sounds    14:00 EDT, 06/07/25:  Patient's diagnosis of musculoskeletal back pain was discussed with him patient will be given a Medrol Dosepak, a prescription for Flexeril, and a prescription for Ultram.  The patient should call Aisha Doshi on Monday for follow-up appointment within 1 week.  Patient should rest.  He should ice the his back in the area where he is tender for 20 minutes every 2-3 hours while awake for 2 to 3 days and then switch to moist heat for 20 minutes every 2-3 hours while awake for 2 to 3 days.  The patient return to the emergency department if there is worsening pain, numbness, tingling, weakness, change in bladder or bowel function, worse anyway at all.  All the patient's questions were answered he will be discharged in good condition.                                               Medical Decision Making  Amount and/or Complexity of Data Reviewed  Labs: ordered.  Radiology: ordered.    Risk  Prescription drug management.        Final diagnoses:   None       ED Disposition  ED Disposition       None            No follow-up provider specified.       Medication List      No changes were made to  your prescriptions during this visit.       No orders to display     Labs Reviewed - No data to display  No results found.    Final diagnoses:   None         ED Medications:  Medications - No data to display    New Medications:     Medication List        ASK your doctor about these medications      fluticasone 50 MCG/ACT nasal spray  Commonly known as: FLONASE  Administer 2 sprays into the nostril(s) as directed by provider Daily.              Stopped Medications:     Medication List        ASK your doctor about these medications      fluticasone 50 MCG/ACT nasal spray  Commonly known as: FLONASE  Administer 2 sprays into the nostril(s) as directed by provider Daily.                   Tk Neumann MD  06/07/25 8802

## 2025-06-09 ENCOUNTER — TELEPHONE (OUTPATIENT)
Dept: INTERNAL MEDICINE | Facility: CLINIC | Age: 40
End: 2025-06-09

## 2025-06-09 NOTE — TELEPHONE ENCOUNTER
Hub staff attempted to follow warm transfer process and was unsuccessful     Caller: Allison Valladares    Relationship to patient: Emergency Contact    Best call back number: [phone]     Patient is needing: PATIENT WAS IN THE ER ON 6/7 AND IS NEEDING A HOSPITAL FOLLOW UP VISIT    PLEASE CALL AND ADVISE

## 2025-06-13 ENCOUNTER — OFFICE VISIT (OUTPATIENT)
Dept: INTERNAL MEDICINE | Facility: CLINIC | Age: 40
End: 2025-06-13
Payer: COMMERCIAL

## 2025-06-13 VITALS
TEMPERATURE: 98 F | OXYGEN SATURATION: 98 % | HEART RATE: 70 BPM | HEIGHT: 67 IN | WEIGHT: 214 LBS | DIASTOLIC BLOOD PRESSURE: 78 MMHG | SYSTOLIC BLOOD PRESSURE: 112 MMHG | BODY MASS INDEX: 33.59 KG/M2

## 2025-06-13 DIAGNOSIS — M54.16 CHRONIC RADICULAR LUMBAR PAIN: Primary | ICD-10-CM

## 2025-06-13 DIAGNOSIS — G89.29 CHRONIC RADICULAR LUMBAR PAIN: Primary | ICD-10-CM

## 2025-06-13 NOTE — PROGRESS NOTES
Chief Complaint   Patient presents with    Hospital Follow Up Visit     6/7 ED for back pain. Was primarily on left side of back and radiated to front. Had CT and xray. Last ov with neurosurgery in Jan (in chart)       Subjective     Boaz Valladares is a 40 y.o. male being seen for a follow up appointment today regarding acute on chronic back pain. He has history of Multilevel DDD. He reports that this started in 2003 his senior year of high school. This happened while playing football, but no known injury.  He describes bilateral lower back pain, left > right. This has been intermittently since then. He denies leg weakness or numbness. In 2009, he was evaluted by Neurosurgery, Abigail Kelly for lumbar pain .He reports that he was told that he was too young for surgery, and the body will heal itself. He was sent back to neurosurgery in 2024.Office Visit with Donavon Stratton MD (01/20/2025)  CONSULT ONLY-REPEAT PT with Rickie Alegria MD (03/06/2025) He did epidurals, aquatic therapy, PT, chiropratic care and accupuncture. He was seen in the ER 6-7-2025 for left lower back pain radiating into left abd. A CT abd/pelvis was negative for acute abd findings.  Today, in left lower back without radiation. His pain is also at L2-3 and radiating into flank.  Pain rated 1-2 of 10 at this time.        History of Present Illness     No Known Allergies      Current Outpatient Medications:     cyclobenzaprine (FLEXERIL) 10 MG tablet, Take 1 tablet by mouth 3 (Three) Times a Day As Needed for Muscle Spasms., Disp: 30 tablet, Rfl: 0    fluticasone (FLONASE) 50 MCG/ACT nasal spray, Administer 2 sprays into the nostril(s) as directed by provider Daily., Disp: 16 g, Rfl: 0    methylPREDNISolone (Medrol) 4 MG dose pack, follow package directions (Patient not taking: Reported on 6/13/2025), Disp: 21 tablet, Rfl: 0    traMADol (Ultram) 50 MG tablet, Take 1 tablet by mouth Every 6 (Six) Hours As Needed for Moderate Pain. (Patient  not taking: Reported on 6/13/2025), Disp: 20 tablet, Rfl: 0    The following portions of the patient's history were reviewed and updated as appropriate: allergies, current medications, past family history, past medical history, past social history, past surgical history, and problem list.    Review of Systems   Constitutional: Negative.    HENT: Negative.     Eyes: Negative.    Respiratory: Negative.     Cardiovascular: Negative.  Negative for chest pain, palpitations and leg swelling.   Gastrointestinal: Negative.    Endocrine: Negative.    Genitourinary: Negative.    Musculoskeletal:  Positive for arthralgias and back pain.   Hematological: Negative.    Psychiatric/Behavioral: Negative.     All other systems reviewed and are negative.      Assessment     Physical Exam  Vitals reviewed.   Constitutional:       Appearance: Normal appearance.   Cardiovascular:      Rate and Rhythm: Normal rate and regular rhythm.      Pulses: Normal pulses.      Heart sounds: Normal heart sounds. No murmur heard.  Pulmonary:      Effort: Pulmonary effort is normal. No respiratory distress.      Breath sounds: Normal breath sounds. No stridor.   Musculoskeletal:      Thoracic back: Normal.      Lumbar back: Tenderness (L2-3) present. No bony tenderness. Decreased range of motion. Negative right straight leg raise test and negative left straight leg raise test. No scoliosis.   Skin:     General: Skin is warm and dry.   Neurological:      General: No focal deficit present.      Mental Status: He is alert and oriented to person, place, and time.      Gait: Gait normal.   Psychiatric:         Mood and Affect: Mood normal.         Behavior: Behavior normal.         Thought Content: Thought content normal.         Plan     His MRI was eviewed with the patient from:     MRI Lumbar Spine Without Contrast (01/23/2024 09:43)   ED with Tk Neumann MD (06/07/2025)     Diagnoses and all orders for this visit:    1. Chronic radicular lumbar  pain (Primary)  -     Ambulatory Referral to Pain Management      We discussed DDD etiology, risk and treatment. We discussed preventing progression, managing pain. He will need to start yoga Yoga with [employer].     Follow up with pain management.

## 2025-06-16 ENCOUNTER — ANESTHESIA EVENT (OUTPATIENT)
Dept: PAIN MEDICINE | Facility: HOSPITAL | Age: 40
End: 2025-06-16
Payer: COMMERCIAL

## 2025-06-16 ENCOUNTER — HOSPITAL ENCOUNTER (OUTPATIENT)
Dept: PAIN MEDICINE | Facility: HOSPITAL | Age: 40
Discharge: HOME | End: 2025-06-16
Payer: COMMERCIAL

## 2025-06-16 ENCOUNTER — HOSPITAL ENCOUNTER (OUTPATIENT)
Dept: GENERAL RADIOLOGY | Facility: HOSPITAL | Age: 40
Discharge: HOME | End: 2025-06-16
Payer: COMMERCIAL

## 2025-06-16 ENCOUNTER — ANESTHESIA (OUTPATIENT)
Dept: PAIN MEDICINE | Facility: HOSPITAL | Age: 40
End: 2025-06-16
Payer: COMMERCIAL

## 2025-06-16 VITALS
HEART RATE: 67 BPM | WEIGHT: 215 LBS | SYSTOLIC BLOOD PRESSURE: 121 MMHG | HEIGHT: 67 IN | TEMPERATURE: 97.3 F | RESPIRATION RATE: 14 BRPM | DIASTOLIC BLOOD PRESSURE: 65 MMHG | OXYGEN SATURATION: 99 % | BODY MASS INDEX: 33.74 KG/M2

## 2025-06-16 DIAGNOSIS — G89.29 CHRONIC RADICULAR LUMBAR PAIN: Primary | ICD-10-CM

## 2025-06-16 DIAGNOSIS — R52 PAIN: ICD-10-CM

## 2025-06-16 DIAGNOSIS — M54.16 CHRONIC RADICULAR LUMBAR PAIN: Primary | ICD-10-CM

## 2025-06-16 PROCEDURE — 25510000001 IOPAMIDOL 41 % SOLUTION: Performed by: ANESTHESIOLOGY

## 2025-06-16 PROCEDURE — 25010000002 METHYLPREDNISOLONE PER 80 MG: Performed by: ANESTHESIOLOGY

## 2025-06-16 PROCEDURE — 77003 FLUOROGUIDE FOR SPINE INJECT: CPT

## 2025-06-16 RX ORDER — METHYLPREDNISOLONE ACETATE 80 MG/ML
80 INJECTION, SUSPENSION INTRA-ARTICULAR; INTRALESIONAL; INTRAMUSCULAR; SOFT TISSUE ONCE
Status: COMPLETED | OUTPATIENT
Start: 2025-06-16 | End: 2025-06-16

## 2025-06-16 RX ORDER — LIDOCAINE HYDROCHLORIDE 10 MG/ML
1 INJECTION, SOLUTION INFILTRATION; PERINEURAL ONCE
Status: DISCONTINUED | OUTPATIENT
Start: 2025-06-16 | End: 2025-06-17 | Stop reason: HOSPADM

## 2025-06-16 RX ORDER — FENTANYL CITRATE 50 UG/ML
50 INJECTION, SOLUTION INTRAMUSCULAR; INTRAVENOUS ONCE
Status: DISCONTINUED | OUTPATIENT
Start: 2025-06-16 | End: 2025-06-17 | Stop reason: HOSPADM

## 2025-06-16 RX ORDER — IOPAMIDOL 408 MG/ML
10 INJECTION, SOLUTION INTRATHECAL
Status: COMPLETED | OUTPATIENT
Start: 2025-06-16 | End: 2025-06-16

## 2025-06-16 RX ORDER — MIDAZOLAM HYDROCHLORIDE 1 MG/ML
1 INJECTION, SOLUTION INTRAMUSCULAR; INTRAVENOUS ONCE AS NEEDED
Status: DISCONTINUED | OUTPATIENT
Start: 2025-06-16 | End: 2025-06-17 | Stop reason: HOSPADM

## 2025-06-16 RX ADMIN — IOPAMIDOL 10 ML: 408 INJECTION, SOLUTION INTRATHECAL at 13:40

## 2025-06-16 RX ADMIN — METHYLPREDNISOLONE ACETATE 80 MG: 80 INJECTION, SUSPENSION INTRA-ARTICULAR; INTRALESIONAL; INTRAMUSCULAR; SOFT TISSUE at 13:40

## 2025-06-16 NOTE — H&P
[facility]    History and Physical    Patient Name: Boaz Valladares  :  1985  MRN:  [id number]  Date of Admission: 2025    Subjective     Patient is a 40 y.o. male presents with chief complaint of acute on chronic mid back , low back, and wailst pain.  Onset of symptoms was abrupt starting several months ago.  Symptoms are associated/aggravated by nothing in particular or activity. Symptoms improve with injection    The following portions of the patients history were reviewed and updated as appropriate: current medications, allergies, past medical history, past surgical history, past family history, past social history, and problem list    He reports low back pain with left sided radicular symptoms.  He went to the emergency room thinking might be having a kidney stone which he did not.  He reports previously that he had radicular symptoms in the anterior thigh and groin area.  He did have epidurals more the L4-5 level but they did help his radicular symptoms into his groin and anterior thigh.  He is having radicular symptoms that go across the top of his waist and seem to be a little bit more in the lateral aspect of his abdomen perhaps in the low thoracic level but this could be also upper lumbar as it does not radiate so far medially to be able to tell exactly.  He does have herniated disc at L2-3 and that would be an appropriate place to start for this today.  If he continues to have pain that radiates more medially and is clearly in the abdominal dermatomes we may wish to consider getting a MRI of his thoracic spine.          MRI FINDINGS:  Sagittal alignment. There is intervertebral disc desiccation T11-12 and T12-L1 and also at L2-3 L4-5 and L5-S1 with mild multiple level loss of intervertebral disc height. The conus medullaris terminates at L1-2 and is normal. Bone marrow signal  intensity is normal.        At L1-2, there is mild bilateral facet degenerative change. There is no canal  stenosis or foraminal compromise.     At L2-3, there is a large disc extrusion that extends caudad from the disc but remains contiguous with the. It is centered in a left subarticular location. It measures about 2.1 cm SI dimension by 0.8 cm AP dimension. It is broad in the ML dimension, and  at least 2.1 cm. There is severe impingement on the left lateral recess expected course of the left L3 root and milder impingement on the right lateral recess. There is moderate to severe central canal stenosis with displacement of nerve roots and thecal  sac toward the right side posterolaterally. There is mild inferior foraminal narrowing bilaterally. There is mild bilateral facet degenerative change.     At L3-4, there is mild bilateral facet degenerative change. There is mild effacement of the anterior thecal sac. Mild bilateral foraminal narrowing.     At L4-5, there is a left subarticular disc extrusion that remains contiguous with the disc. It fills the left lateral recess and measures about 11 mm mL dimension by 8 mm AP dimension and 8 mm SI dimension. There is severe canal stenosis with  displacement of the thecal sac and right side posterolaterally. There is mild mass effect on the right lateral recess. There is mild to moderate right and left foraminal narrowing.     At L5-S1, there is mild bilateral facet degenerative change. Is a broad posterior protrusion. Mild effacement of anterior thecal sac but no central canal stenosis. Mild to moderate right and mild left foraminal narrowing.        IMPRESSION:     1. There is a large disc extrusion extending caudad from the L2-3 intervertebral disc and filling the left lateral recess. It lies posterior to the upper two thirds of the L3 vertebral body and impinges in particular on the expected course of the left L3  root in the left lateral recess. There is associated moderate to severe canal stenosis.   2. There is a moderate-sized disc extrusion at L4-5 also the left  "paramedian location. It contributes to severe canal stenosis and results in particular impingement on expected course of the left L5 root in the left lateral recess.  3. See additional findings and the level by level discussion.     Signer Name: Marybeth Maguire MD   Objective     Past Medical History:   Past Medical History:   Diagnosis Date    Anxiety     Herniated lumbar intervertebral disc     Low back pain      Past Surgical History:   Past Surgical History:   Procedure Laterality Date    EPIDURAL BLOCK       Family History:   Family History   Problem Relation Age of Onset    Hypertension Mother     Melanoma Father     No Known Problems Brother     No Known Problems Maternal Grandmother     Rectal cancer Maternal Grandfather     COPD Paternal Grandmother     Heart disease Paternal Grandfather     Heart attack Paternal Grandfather     No Known Problems Daughter     No Known Problems Son     No Known Problems Brother     No Known Problems Brother      Social History:   Social History     Socioeconomic History    Marital status:    Tobacco Use    Smoking status: Never     Passive exposure: Never    Smokeless tobacco: Never   Vaping Use    Vaping status: Never Used   Substance and Sexual Activity    Alcohol use: Yes     Alcohol/week: 5.0 - 6.0 standard drinks of alcohol     Types: 5 - 6 Cans of beer per week    Drug use: No    Sexual activity: Yes     Partners: Female       Vital Signs Range for the last 24 hours  Temperature: Temp:  [36.3 °C (97.3 °F)] 36.3 °C (97.3 °F)   Temp Source: Temp src: Infrared   BP: BP: (158)/(87) 158/87   Pulse: Heart Rate:  [79] 79   Respirations: Resp:  [16] 16   SPO2: SpO2:  [100 %] 100 %   O2 Amount (l/min):     O2 Devices Device (Oxygen Therapy): room air   Weight: Weight:  [97.5 kg (215 lb)] 97.5 kg (215 lb)     Flowsheet Rows      Flowsheet Row First Filed Value   Admission Height 170.2 cm (67\") Documented at 06/16/2025 1320   Admission Weight 97.5 kg (215 lb) Documented " "at 06/16/2025 1320            --------------------------------------------------------------------------------    Current Outpatient Medications   Medication Sig Dispense Refill    cyclobenzaprine (FLEXERIL) 10 MG tablet Take 1 tablet by mouth 3 (Three) Times a Day As Needed for Muscle Spasms. 30 tablet 0    fluticasone (FLONASE) 50 MCG/ACT nasal spray Administer 2 sprays into the nostril(s) as directed by provider Daily. 16 g 0     No current facility-administered medications for this encounter.       --------------------------------------------------------------------------------  Assessment & Plan      Anesthesia Evaluation           Pain impairs ability to perform ADLs: Ambulation, Exercise/Activity and Working  Modalities previously tried to control pain with limited effectiveness within the last 4-6 weeks: Prescription medications, OTC medications, Rest and Physical therapy     Airway   Mallampati: II  TM distance: >3 FB  Dental      Pulmonary    (-) wheezes  Cardiovascular     Rhythm: regular    (-) murmur      Neuro/Psych  (-) normal sensory deficit, left straight leg raise test, right straight leg raise test  GI/Hepatic/Renal/Endo      Musculoskeletal (-) normal exam    Abdominal    Substance History      OB/GYN          Other                 Diagnosis and Plan    Treatment Plan  ASA 2   Patient has had previous injection/procedure with 50-75% improvement.   Procedures: Lumbar Epidural Steroid Injection(LESI), With fluoroscopy,      Anesthetic plan and risks discussed with patient.      Discussed with patient risk and benefits of SHASHI including but not limited to : Bleeding, infection, PDPH, inadvertant spinal anesthetic, worsening pain, hyperglycemia, hypertension, CHF, nerve damage, steroid \"toxicity\" and AVN of hips.  Pt agrees to proceed.  Diagnosis     * Intervertebral disc disorder with radiculopathy of lumbar region [M51.16]                      "

## 2025-06-16 NOTE — ANESTHESIA PROCEDURE NOTES
PAIN Epidural block    Pre-sedation assessment completed: 6/16/2025 1:39 PM    Patient reassessed immediately prior to procedure    Start Time: 6/16/2025 1:40 PM  Stop Time: 6/16/2025 1:48 PM  Indication:at surgeon's request and procedure for pain  Performed By  Anesthesiologist: Rickie Alegria MD  Preanesthetic Checklist  Completed: patient identified, risks and benefits discussed, surgical consent, monitors and equipment checked, pre-op evaluation and timeout performed  Additional Notes  Post-Op Diagnosis Codes:     * Intervertebral disc disorder with radiculopathy of lumbar region [M51.16]    Prep:  Pt Position:prone  Sterile Tech:sterile barrier, mask and gloves  Prep:chlorhexidine gluconate and isopropyl alcohol  Monitoring:blood pressure monitoring, continuous pulse oximetry and EKG  Procedure:Sedation: no     Approach:left paramedian  Guidance: fluoroscopy  Location:lumbar  Level:2-3  Needle Gauge:20 G  Aspiration:negative  Test Dose:negative  Medications:  Isovue:2mL  Depomedrol:80mg  Post Assessment:  Pt Tolerance:patient tolerated the procedure well with no apparent complications  Complications:no

## 2025-06-16 NOTE — DISCHARGE INSTRUCTIONS
EPIDURAL STEROID INJECTION          An epidural steroid injection is a shot of steroid medicine and numbing medicine that is given into the space between the spinal cord and the bones of the back (epidural space).  The injection helps relieve pain by an irritated or swollen nerve root.    TELL YOUR HEALTH CARE PROVIDER ABOUT:  Any allergies you have  All medicines you are taking including any over the counter medicines  Any blood disorders you have  Any surgeries you have had  Any medical conditions you have  Whether you are pregnant or may be pregnant    WHAT ARE THE RISK?  Generally, this is a safe procedure. However,problems may occur, including  Headache  Bleeding  Infection  Allergic Reaction  Nerve Damage    WHAT CAN I EXPECT AFTER THE PROCEDURE?    INJECTION SITE  Remove the Band-Aid/s after 24 hours  Check your injection site every day for signs of infection.  Check for:             Redness             Bleeding (small amt is normal)             Warmth             Pus or bad odor  Some numbness may be experienced for several hours following the procedure.  Avoid using heat on the injection site for 24 hours. You may use ice intermittently if needed by placing a         towel between your skin and the ice bag and using the ice for 20 minutes 2-3 times a day.  Do not take baths, swim or use a hot tub for 24 hours.    ACTIVITY  No strenuous activity for 24 hours then return to normal activity as tolerated.  If your leg is numb, no driving until full sensation and strength has returned.    GENERAL INSTRUCTIONS:  The injection site may feel numb, use ice with caution if numbness is present and no heat for 24 hours or until numbness is gone.   If you have numbness or weakness in your arm or leg, use those areas with caution until normal sensation returns.  It is not uncommon to notice an increase in discomfort or a change in the location of discomfort for 3-4 days after the procedure.  If discomfort is noticed  at the injection site, ice may be            applied to that area for 20 min 2-3 times a day.  Take the pain medicine your physician has prescribed or over the counter pain relievers as long as you do not have any contraindications.  If you are a diabetic, monitor your blood sugar closely.  The steroids used in your procedure may increase your blood sugar level up to 36 hours after the injection.  If your blood sugar is greater than 250, call the physician that helps you monitor your blood sugar.  Keep all follow-up visits as scheduled by your health care provider. This is important.    CONTACT OUR OFFICE IF:  You have any of these signs of infection            -Redness, swelling, or warmth around your injection site.            -Fluid or blood coming from your injection site (small amt of blood is normal)            -Pus or a bad odor from your injection site            -A fever  You develop a severe headache or a stiff neck  You lose control of your bladder or bowel movements      PAIN MANAGEMENT CENTER HOURS   Monday-Friday 7:30 am. - 4:00 pm.  For any problem related to your procedure we can be reached at [phone].  If you experience an emergency with your procedure, call [phone] or go to the emergency room.    Back Exercises  These exercises help to make your trunk and back strong. They also help to keep the lower back flexible. Doing these exercises can help to prevent or lessen pain in your lower back.  If you have back pain, try to do these exercises 2-3 times each day or as told by your doctor.  As you get better, do the exercises once each day. Repeat the exercises more often as told by your doctor.  To stop back pain from coming back, do the exercises once each day, or as told by your doctor.  Do exercises exactly as told by your doctor. Stop right away if you feel sudden pain or your pain gets worse.  Exercises  Single knee to chest  Do these steps 3-5 times in a row for each leg:  Lie on your  back on a firm bed or the floor with your legs stretched out.  Bring one knee to your chest.  Grab your knee or thigh with both hands and hold it in place.  Pull on your knee until you feel a gentle stretch in your lower back or butt.  Keep doing the stretch for 10-30 seconds.  Slowly let go of your leg and straighten it.  Pelvic tilt  Do these steps 5-10 times in a row:  Lie on your back on a firm bed or the floor with your legs stretched out.  Bend your knees so they point up to the ceiling. Your feet should be flat on the floor.  Tighten your lower belly (abdomen) muscles to press your lower back against the floor. This will make your tailbone point up to the ceiling instead of pointing down to your feet or the floor.  Stay in this position for 5-10 seconds while you gently tighten your muscles and breathe evenly.  Cat-cow  Do these steps until your lower back bends more easily:  Get on your hands and knees on a firm bed or the floor. Keep your hands under your shoulders, and keep your knees under your hips. You may put padding under your knees.  Let your head hang down toward your chest. Tighten (contract) the muscles in your belly. Point your tailbone toward the floor so your lower back becomes rounded like the back of a cat.  Stay in this position for 5 seconds.  Slowly lift your head. Let the muscles of your belly relax. Point your tailbone up toward the ceiling so your back forms a sagging arch like the back of a cow.  Stay in this position for 5 seconds.     Press-ups  Do these steps 5-10 times in a row:  Lie on your belly (face-down) on a firm bed or the floor.  Place your hands near your head, about shoulder-width apart.  While you keep your back relaxed and keep your hips on the floor, slowly straighten your arms to raise the top half of your body and lift your shoulders. Do not use your back muscles. You may change where you place your hands to make yourself more comfortable.  Stay in this position for  5 seconds. Keep your back relaxed.  Slowly return to lying flat on the floor.     Bridges  Do these steps 10 times in a row:  Lie on your back on a firm bed or the floor.  Bend your knees so they point up to the ceiling. Your feet should be flat on the floor. Your arms should be flat at your sides, next to your body.  Tighten your butt muscles and lift your butt off the floor until your waist is almost as high as your knees. If you do not feel the muscles working in your butt and the back of your thighs, slide your feet 1-2 inches (2.5-5 cm) farther away from your butt.  Stay in this position for 3-5 seconds.  Slowly lower your butt to the floor, and let your butt muscles relax.  If this exercise is too easy, try doing it with your arms crossed over your chest.  Belly crunches  Do these steps 5-10 times in a row:  Lie on your back on a firm bed or the floor with your legs stretched out.  Bend your knees so they point up to the ceiling. Your feet should be flat on the floor.  Cross your arms over your chest.  Tip your chin a little bit toward your chest, but do not bend your neck.  Tighten your belly muscles and slowly raise your chest just enough to lift your shoulder blades a tiny bit off the floor. Avoid raising your body higher than that because it can put too much stress on your lower back.  Slowly lower your chest and your head to the floor.  Back lifts  Do these steps 5-10 times in a row:  Lie on your belly (face-down) with your arms at your sides, and rest your forehead on the floor.  Tighten the muscles in your legs and your butt.  Slowly lift your chest off the floor while you keep your hips on the floor. Keep the back of your head in line with the curve in your back. Look at the floor while you do this.  Stay in this position for 3-5 seconds.  Slowly lower your chest and your face to the floor.  Contact a doctor if:  Your back pain gets a lot worse when you do an exercise.  Your back pain does not get  better within 2 hours after you exercise.  If you have any of these problems, stop doing the exercises. Do not do them again unless your doctor says it is okay.  Get help right away if:  You have sudden, very bad back pain. If this happens, stop doing the exercises. Do not do them again unless your doctor says it is okay.  This information is not intended to replace advice given to you by your health care provider. Make sure you discuss any questions you have with your health care provider.  Document Revised: 03/02/2022 Document Reviewed: 03/02/2022  [employer] Patient Education © 2024 [employer]    What is [employer]?  [employer] is a fitness and wellness program offered at no additional cost to seniors 65+ on eligible Medicare plans that helps you get active, get fit, and connect with others.  The program is designed for all levels and abilities and provides access to online and in-person classes, over 15,000 fitness locations, and health & wellness discounts.  Before starting any exercise program, consult with your primary care provider.  For additional information and to check eligibility:  tools[employer]

## 2025-06-20 ENCOUNTER — TELEPHONE (OUTPATIENT)
Dept: NEUROSURGERY | Facility: CLINIC | Age: 40
End: 2025-06-20
Payer: COMMERCIAL

## 2025-06-20 NOTE — TELEPHONE ENCOUNTER
"Spoke to patient and informed him per Dr. Stratton,    \"It has been 5 months since he was seen. Ask him if he had much leg pain. The chart from the ED suggested it was mainly flank pain which does not sound like it is his back. Needs to see an APC before we decide which type of injection to do.\"    Patient informed me that he went  to see his PCP and she referred him to pain management for an epidural block. Patient will call if his symptoms worsen.    There are no further actions needed on this encounter.  "

## 2025-07-01 ENCOUNTER — OFFICE VISIT (OUTPATIENT)
Dept: INTERNAL MEDICINE | Facility: CLINIC | Age: 40
End: 2025-07-01
Payer: COMMERCIAL

## 2025-07-01 VITALS
WEIGHT: 219 LBS | BODY MASS INDEX: 34.3 KG/M2 | SYSTOLIC BLOOD PRESSURE: 128 MMHG | TEMPERATURE: 98 F | DIASTOLIC BLOOD PRESSURE: 82 MMHG | OXYGEN SATURATION: 97 % | HEART RATE: 95 BPM

## 2025-07-01 DIAGNOSIS — M54.50 CHRONIC MIDLINE LOW BACK PAIN WITHOUT SCIATICA: ICD-10-CM

## 2025-07-01 DIAGNOSIS — G89.29 CHRONIC MIDLINE LOW BACK PAIN WITHOUT SCIATICA: ICD-10-CM

## 2025-07-01 DIAGNOSIS — Z00.00 ANNUAL PHYSICAL EXAM: Primary | ICD-10-CM

## 2025-07-01 DIAGNOSIS — Z13.220 SCREENING, LIPID: ICD-10-CM

## 2025-07-01 DIAGNOSIS — Z13.1 SCREENING FOR DIABETES MELLITUS: ICD-10-CM

## 2025-07-01 DIAGNOSIS — Z12.5 SCREENING FOR PROSTATE CANCER: ICD-10-CM

## 2025-07-01 DIAGNOSIS — Z13.0 SCREENING, ANEMIA, DEFICIENCY, IRON: ICD-10-CM

## 2025-07-01 PROCEDURE — 99396 PREV VISIT EST AGE 40-64: CPT | Performed by: NURSE PRACTITIONER

## 2025-07-01 NOTE — PROGRESS NOTES
Annual Exam      Boaz Valladares is being seen for a Complete physical exam. His last physical was 3-4-2024.     Social: He is working full time for [employer] as a . He is  to [place]. His household includes wife and 2 children, ages 11 and 14years     Lifestyle: He is not exercising regularly. He use tobacco. He drinks alcohol 5 per week.    Screening: Colonoscopy was completed never.        History of Present Illness       The following portions of the patient's history were reviewed and updated as appropriate: allergies, current medications, past family history, past medical history, past social history, past surgical history, and problem list.    Review of Systems   Constitutional: Negative.    HENT: Negative.     Eyes: Negative.    Respiratory: Negative.     Cardiovascular: Negative.  Negative for chest pain, palpitations and leg swelling.   Gastrointestinal: Negative.    Endocrine: Negative.    Genitourinary: Negative.  Negative for difficulty urinating.   Musculoskeletal:  Positive for arthralgias and back pain.   Allergic/Immunologic: Negative.  Negative for environmental allergies, food allergies and immunocompromised state.   Neurological: Negative.  Negative for dizziness and facial asymmetry.   Hematological: Negative.    Psychiatric/Behavioral: Negative.     All other systems reviewed and are negative.      Objective          /82 (BP Location: Left arm, Patient Position: Sitting, Cuff Size: Adult)   Pulse 95   Temp 98 °F (36.7 °C) (Infrared)   Wt 99.3 kg (219 lb)   SpO2 97%   BMI 34.30 kg/m²     General Appearance:    Alert, cooperative, no distress, appears stated age   Head:    Normocephalic, without obvious abnormality, atraumatic   Eyes:    PERRL, conjunctiva/corneas clear, EOM's intact, fundi     benign, both eyes        Ears:    Normal TM's and external ear canals, both ears   Nose:   Nares normal, septum midline, mucosa normal, no drainage    or sinus tenderness   Throat:    Lips, mucosa, and tongue normal; teeth and gums normal   Neck:   Supple, symmetrical, trachea midline, no adenopathy;        thyroid:  No enlargement/tenderness/nodules; no carotid    bruit or JVD   Back:     Symmetric, no curvature, ROM normal, no CVA tenderness   Lungs:     Clear to auscultation bilaterally, respirations unlabored   Chest wall:    No tenderness or deformity   Heart:    Regular rate and rhythm, S1 and S2 normal, no murmur, rub    or gallop   Abdomen:     Soft, non-tender, bowel sounds active all four quadrants,     no masses, no organomegaly   Genitalia:    deferred   Rectal:    deferred   Extremities:   Extremities normal, atraumatic, no cyanosis or edema   Pulses:   2+ and symmetric all extremities   Skin:   Skin color, texture, turgor normal, no rashes or lesions   Lymph nodes:   Cervical, supraclavicular, and axillary nodes normal   Neurologic:   CNII-XII intact. Normal strength, sensation and reflexes      throughout              Assessment & Plan   Diagnoses and all orders for this visit:    1. Annual physical exam (Primary)    2. Chronic midline low back pain without sciatica  -     Ambulatory Referral to Massage Therapy    3. Screening for prostate cancer  -     PSA Screen; Future    4. Screening, anemia, deficiency, iron  -     CBC & Differential; Future    5. Screening for diabetes mellitus  -     Comprehensive Metabolic Panel; Future    6. Screening, lipid  -     Lipid Panel With / Chol / HDL Ratio; Future      Preventive counseling: Discussed starting yoga, cryotherapy and muscular therapy. Discussed a Whole Foods/keto plan. Discussed tobacco cessation. Labs need to be completed.     Follow up in 1 year w labs

## 2025-07-28 ENCOUNTER — ANESTHESIA (OUTPATIENT)
Dept: PAIN MEDICINE | Facility: HOSPITAL | Age: 40
End: 2025-07-28
Payer: COMMERCIAL

## 2025-07-28 ENCOUNTER — HOSPITAL ENCOUNTER (OUTPATIENT)
Dept: GENERAL RADIOLOGY | Facility: HOSPITAL | Age: 40
Discharge: HOME | End: 2025-07-28
Payer: COMMERCIAL

## 2025-07-28 ENCOUNTER — ANESTHESIA EVENT (OUTPATIENT)
Dept: PAIN MEDICINE | Facility: HOSPITAL | Age: 40
End: 2025-07-28
Payer: COMMERCIAL

## 2025-07-28 ENCOUNTER — HOSPITAL ENCOUNTER (OUTPATIENT)
Dept: PAIN MEDICINE | Facility: HOSPITAL | Age: 40
Discharge: HOME | End: 2025-07-28
Payer: COMMERCIAL

## 2025-07-28 VITALS
RESPIRATION RATE: 16 BRPM | HEART RATE: 53 BPM | OXYGEN SATURATION: 97 % | SYSTOLIC BLOOD PRESSURE: 112 MMHG | TEMPERATURE: 97.1 F | DIASTOLIC BLOOD PRESSURE: 87 MMHG

## 2025-07-28 DIAGNOSIS — M54.16 CHRONIC RADICULAR LUMBAR PAIN: ICD-10-CM

## 2025-07-28 DIAGNOSIS — M51.16 HERNIATION OF LUMBAR INTERVERTEBRAL DISC WITH RADICULOPATHY: Primary | ICD-10-CM

## 2025-07-28 DIAGNOSIS — G89.29 CHRONIC RADICULAR LUMBAR PAIN: ICD-10-CM

## 2025-07-28 DIAGNOSIS — R52 PAIN: ICD-10-CM

## 2025-07-28 PROCEDURE — 25510000001 IOPAMIDOL 41 % SOLUTION: Performed by: ANESTHESIOLOGY

## 2025-07-28 PROCEDURE — 25010000002 METHYLPREDNISOLONE PER 80 MG: Performed by: ANESTHESIOLOGY

## 2025-07-28 PROCEDURE — 77003 FLUOROGUIDE FOR SPINE INJECT: CPT

## 2025-07-28 RX ORDER — METHYLPREDNISOLONE ACETATE 80 MG/ML
80 INJECTION, SUSPENSION INTRA-ARTICULAR; INTRALESIONAL; INTRAMUSCULAR; SOFT TISSUE ONCE
Status: COMPLETED | OUTPATIENT
Start: 2025-07-28 | End: 2025-07-28

## 2025-07-28 RX ORDER — IOPAMIDOL 408 MG/ML
10 INJECTION, SOLUTION INTRATHECAL
Status: COMPLETED | OUTPATIENT
Start: 2025-07-28 | End: 2025-07-28

## 2025-07-28 RX ORDER — MIDAZOLAM HYDROCHLORIDE 1 MG/ML
1 INJECTION, SOLUTION INTRAMUSCULAR; INTRAVENOUS ONCE AS NEEDED
Status: DISCONTINUED | OUTPATIENT
Start: 2025-07-28 | End: 2025-07-29 | Stop reason: HOSPADM

## 2025-07-28 RX ORDER — FENTANYL CITRATE 50 UG/ML
25 INJECTION, SOLUTION INTRAMUSCULAR; INTRAVENOUS
Refills: 0 | Status: DISCONTINUED | OUTPATIENT
Start: 2025-07-28 | End: 2025-07-29 | Stop reason: HOSPADM

## 2025-07-28 RX ORDER — LIDOCAINE HYDROCHLORIDE 10 MG/ML
1 INJECTION, SOLUTION INFILTRATION; PERINEURAL ONCE
Status: DISCONTINUED | OUTPATIENT
Start: 2025-07-28 | End: 2025-07-29 | Stop reason: HOSPADM

## 2025-07-28 RX ADMIN — METHYLPREDNISOLONE ACETATE 80 MG: 80 INJECTION, SUSPENSION INTRA-ARTICULAR; INTRALESIONAL; INTRAMUSCULAR; SOFT TISSUE at 10:49

## 2025-07-28 RX ADMIN — IOPAMIDOL 10 ML: 408 INJECTION, SOLUTION INTRATHECAL at 10:49

## 2025-07-28 NOTE — ANESTHESIA PROCEDURE NOTES
PAIN Epidural block    Pre-sedation assessment completed: 7/28/2025 10:47 AM    Patient reassessed immediately prior to procedure    Patient location during procedure: pain clinic  Start Time: 7/28/2025 10:48 AM  Stop Time: 7/28/2025 10:55 AM  Indication:at surgeon's request and procedure for pain  Performed By  Anesthesiologist: Rickie Alegria MD  Preanesthetic Checklist  Completed: patient identified, risks and benefits discussed, surgical consent, monitors and equipment checked, pre-op evaluation and timeout performed  Additional Notes  Post-Op Diagnosis Codes:     * Lumbar radiculopathy [M54.16]     * Intervertebral disc disorder with radiculopathy of lumbar region [M51.16]    Prep:  Pt Position:prone  Sterile Tech:sterile barrier, mask and gloves  Prep:chlorhexidine gluconate and isopropyl alcohol  Monitoring:blood pressure monitoring, continuous pulse oximetry and EKG  Procedure:Sedation: no     Approach:left paramedian  Guidance: fluoroscopy  Location:lumbar  Level:4-5  Needle Type:Tuohy  Needle Gauge:20 G  Aspiration:negative  Test Dose:negative  Medications:  Isovue:2mL  Depomedrol:80mg  Post Assessment:  Pt Tolerance:patient tolerated the procedure well with no apparent complications  Complications:no

## 2025-07-28 NOTE — DISCHARGE INSTRUCTIONS
EPIDURAL STEROID INJECTION          An epidural steroid injection is a shot of steroid medicine and numbing medicine that is given into the space between the spinal cord and the bones of the back (epidural space).  The injection helps relieve pain by an irritated or swollen nerve root.    TELL YOUR HEALTH CARE PROVIDER ABOUT:  Any allergies you have  All medicines you are taking including any over the counter medicines  Any blood disorders you have  Any surgeries you have had  Any medical conditions you have  Whether you are pregnant or may be pregnant    WHAT ARE THE RISK?  Generally, this is a safe procedure. However,problems may occur, including  Headache  Bleeding  Infection  Allergic Reaction  Nerve Damage    WHAT CAN I EXPECT AFTER THE PROCEDURE?    INJECTION SITE  Remove the Band-Aid/s after 24 hours  Check your injection site every day for signs of infection.  Check for:             Redness             Bleeding (small amt is normal)             Warmth             Pus or bad odor  Some numbness may be experienced for several hours following the procedure.  Avoid using heat on the injection site for 24 hours. You may use ice intermittently if needed by placing a         towel between your skin and the ice bag and using the ice for 20 minutes 2-3 times a day.  Do not take baths, swim or use a hot tub for 24 hours.    ACTIVITY  No strenuous activity for 24 hours then return to normal activity as tolerated.  If your leg is numb, no driving until full sensation and strength has returned.    GENERAL INSTRUCTIONS:  The injection site may feel numb, use ice with caution if numbness is present and no heat for 24 hours or until numbness is gone.   If you have numbness or weakness in your arm or leg, use those areas with caution until normal sensation returns.  It is not uncommon to notice an increase in discomfort or a change in the location of discomfort for 3-4 days after the procedure.  If discomfort is noticed  at the injection site, ice may be            applied to that area for 20 min 2-3 times a day.  Take the pain medicine your physician has prescribed or over the counter pain relievers as long as you do not have any contraindications.  If you are a diabetic, monitor your blood sugar closely.  The steroids used in your procedure may increase your blood sugar level up to 36 hours after the injection.  If your blood sugar is greater than 250, call the physician that helps you monitor your blood sugar.  Keep all follow-up visits as scheduled by your health care provider. This is important.    CONTACT OUR OFFICE IF:  You have any of these signs of infection            -Redness, swelling, or warmth around your injection site.            -Fluid or blood coming from your injection site (small amt of blood is normal)            -Pus or a bad odor from your injection site            -A fever  You develop a severe headache or a stiff neck  You lose control of your bladder or bowel movements      PAIN MANAGEMENT [facility]ENTER HOURS   Monday-Friday 7:30 am. - 4:00 pm.  For any problem related to your procedure we can be reached at [phone].  If you experience an emergency with your procedure, call [phone] or go to the emergency room.    Back Exercises  These exercises help to make your trunk and back strong. They also help to keep the lower back flexible. Doing these exercises can help to prevent or lessen pain in your lower back.  If you have back pain, try to do these exercises 2-3 times each day or as told by your doctor.  As you get better, do the exercises once each day. Repeat the exercises more often as told by your doctor.  To stop back pain from coming back, do the exercises once each day, or as told by your doctor.  Do exercises exactly as told by your doctor. Stop right away if you feel sudden pain or your pain gets worse.  Exercises  Single knee to chest  Do these steps 3-5 times in a row for each leg:  Lie on your  back on a firm bed or the floor with your legs stretched out.  Bring one knee to your chest.  Grab your knee or thigh with both hands and hold it in place.  Pull on your knee until you feel a gentle stretch in your lower back or butt.  Keep doing the stretch for 10-30 seconds.  Slowly let go of your leg and straighten it.  Pelvic tilt  Do these steps 5-10 times in a row:  Lie on your back on a firm bed or the floor with your legs stretched out.  Bend your knees so they point up to the ceiling. Your feet should be flat on the floor.  Tighten your lower belly (abdomen) muscles to press your lower back against the floor. This will make your tailbone point up to the ceiling instead of pointing down to your feet or the floor.  Stay in this position for 5-10 seconds while you gently tighten your muscles and breathe evenly.  Cat-cow  Do these steps until your lower back bends more easily:  Get on your hands and knees on a firm bed or the floor. Keep your hands under your shoulders, and keep your knees under your hips. You may put padding under your knees.  Let your head hang down toward your chest. Tighten (contract) the muscles in your belly. Point your tailbone toward the floor so your lower back becomes rounded like the back of a cat.  Stay in this position for 5 seconds.  Slowly lift your head. Let the muscles of your belly relax. Point your tailbone up toward the ceiling so your back forms a sagging arch like the back of a cow.  Stay in this position for 5 seconds.     Press-ups  Do these steps 5-10 times in a row:  Lie on your belly (face-down) on a firm bed or the floor.  Place your hands near your head, about shoulder-width apart.  While you keep your back relaxed and keep your hips on the floor, slowly straighten your arms to raise the top half of your body and lift your shoulders. Do not use your back muscles. You may change where you place your hands to make yourself more comfortable.  Stay in this position for  5 seconds. Keep your back relaxed.  Slowly return to lying flat on the floor.     Bridges  Do these steps 10 times in a row:  Lie on your back on a firm bed or the floor.  Bend your knees so they point up to the ceiling. Your feet should be flat on the floor. Your arms should be flat at your sides, next to your body.  Tighten your butt muscles and lift your butt off the floor until your waist is almost as high as your knees. If you do not feel the muscles working in your butt and the back of your thighs, slide your feet 1-2 inches (2.5-5 cm) farther away from your butt.  Stay in this position for 3-5 seconds.  Slowly lower your butt to the floor, and let your butt muscles relax.  If this exercise is too easy, try doing it with your arms crossed over your chest.  Belly crunches  Do these steps 5-10 times in a row:  Lie on your back on a firm bed or the floor with your legs stretched out.  Bend your knees so they point up to the ceiling. Your feet should be flat on the floor.  Cross your arms over your chest.  Tip your chin a little bit toward your chest, but do not bend your neck.  Tighten your belly muscles and slowly raise your chest just enough to lift your shoulder blades a tiny bit off the floor. Avoid raising your body higher than that because it can put too much stress on your lower back.  Slowly lower your chest and your head to the floor.  Back lifts  Do these steps 5-10 times in a row:  Lie on your belly (face-down) with your arms at your sides, and rest your forehead on the floor.  Tighten the muscles in your legs and your butt.  Slowly lift your chest off the floor while you keep your hips on the floor. Keep the back of your head in line with the curve in your back. Look at the floor while you do this.  Stay in this position for 3-5 seconds.  Slowly lower your chest and your face to the floor.  Contact a doctor if:  Your back pain gets a lot worse when you do an exercise.  Your back pain does not get  better within 2 hours after you exercise.  If you have any of these problems, stop doing the exercises. Do not do them again unless your doctor says it is okay.  Get help right away if:  You have sudden, very bad back pain. If this happens, stop doing the exercises. Do not do them again unless your doctor says it is okay.  This information is not intended to replace advice given to you by your health care provider. Make sure you discuss any questions you have with your health care provider.  Document Revised: 03/02/2022 Document Reviewed: 03/02/2022  [employer] Patient Education © 2024 [employer]    What is [employer]?  [employer] is a fitness and wellness program offered at no additional cost to seniors 65+ on eligible Medicare plans that helps you get active, get fit, and connect with others.  The program is designed for all levels and abilities and provides access to online and in-person classes, over 15,000 fitness locations, and health & wellness discounts.  Before starting any exercise program, consult with your primary care provider.  For additional information and to check eligibility:  [email or web address]

## 2025-07-28 NOTE — H&P
[facility]    History and Physical    Patient Name: Boaz Valladares  :  1985  MRN:  [id number]  Date of Admission: 2025    Subjective     Patient is a 40 y.o. male presents with chief complaint of chronic low back, hips: left, and buttock pain.  Onset of symptoms was gradual starting unknown months ago.  Symptoms are associated/aggravated by nothing in particular, activity, or straining. Symptoms improve with relaxation    The following portions of the patients history were reviewed and updated as appropriate: current medications, allergies, past medical history, past surgical history, past family history, past social history, and problem list      Low back pain that radiates down into his upper hip and the lateral aspect of his leg and down to his lateral ankle.  The pain is mostly in his back however.  He has had an epidural steroid injection at L4-5 and L2-3.  The upper injection really helped him a great deal and his pain in his upper back has been less problematic for him.  He did strain at work causing some back pain and it does radiate to the top that hip and down to the left foot as well.  He feels like the pain currently is a little bit lower than it was previously    Narrative & Impression   INDICATION:    Low back pain intermittently for 20 years. Bilateral leg pain with nerve pain and numbness and tingling. Worse on the right.     TECHNIQUE:   MRI of the lumbar spine without contrast.     COMPARISON:    None available.     FINDINGS:  Sagittal alignment. There is intervertebral disc desiccation T11-12 and T12-L1 and also at L2-3 L4-5 and L5-S1 with mild multiple level loss of intervertebral disc height. The conus medullaris terminates at L1-2 and is normal. Bone marrow signal  intensity is normal.        At L1-2, there is mild bilateral facet degenerative change. There is no canal stenosis or foraminal compromise.     At L2-3, there is a large disc extrusion that extends caudad from the  disc but remains contiguous with the. It is centered in a left subarticular location. It measures about 2.1 cm SI dimension by 0.8 cm AP dimension. It is broad in the ML dimension, and  at least 2.1 cm. There is severe impingement on the left lateral recess expected course of the left L3 root and milder impingement on the right lateral recess. There is moderate to severe central canal stenosis with displacement of nerve roots and thecal  sac toward the right side posterolaterally. There is mild inferior foraminal narrowing bilaterally. There is mild bilateral facet degenerative change.     At L3-4, there is mild bilateral facet degenerative change. There is mild effacement of the anterior thecal sac. Mild bilateral foraminal narrowing.     At L4-5, there is a left subarticular disc extrusion that remains contiguous with the disc. It fills the left lateral recess and measures about 11 mm mL dimension by 8 mm AP dimension and 8 mm SI dimension. There is severe canal stenosis with  displacement of the thecal sac and right side posterolaterally. There is mild mass effect on the right lateral recess. There is mild to moderate right and left foraminal narrowing.     At L5-S1, there is mild bilateral facet degenerative change. Is a broad posterior protrusion. Mild effacement of anterior thecal sac but no central canal stenosis. Mild to moderate right and mild left foraminal narrowing.        IMPRESSION:     1. There is a large disc extrusion extending caudad from the L2-3 intervertebral disc and filling the left lateral recess. It lies posterior to the upper two thirds of the L3 vertebral body and impinges in particular on the expected course of the left L3  root in the left lateral recess. There is associated moderate to severe canal stenosis.   2. There is a moderate-sized disc extrusion at L4-5 also the left paramedian location. It contributes to severe canal stenosis and results in particular impingement on expected  course of the left L5 root in the left lateral recess.  3. See additional findings and the level by level discussion.     Signer Name: Marybeth Maguire MD   Signed: 1/23/2024 12:51 PM EST  [facility]         Objective     Past Medical History:   Past Medical History:   Diagnosis Date   • Anxiety    • Herniated lumbar intervertebral disc    • Low back pain      Past Surgical History:   Past Surgical History:   Procedure Laterality Date   • EPIDURAL BLOCK       Family History:   Family History   Problem Relation Age of Onset   • Hypertension Mother    • Melanoma Father    • No Known Problems Brother    • No Known Problems Maternal Grandmother    • Rectal cancer Maternal Grandfather    • COPD Paternal Grandmother    • Heart disease Paternal Grandfather    • Heart attack Paternal Grandfather    • No Known Problems Daughter    • No Known Problems Son    • No Known Problems Brother    • No Known Problems Brother      Social History:   Social History     Socioeconomic History   • Marital status:    Tobacco Use   • Smoking status: Never     Passive exposure: Never   • Smokeless tobacco: Never   Vaping Use   • Vaping status: Never Used   Substance and Sexual Activity   • Alcohol use: Yes     Alcohol/week: 5.0 - 6.0 standard drinks of alcohol     Types: 5 - 6 Cans of beer per week   • Drug use: No   • Sexual activity: Yes     Partners: Female       Vital Signs Range for the last 24 hours  Temperature: Temp:  [36.2 °C (97.1 °F)] 36.2 °C (97.1 °F)   Temp Source: Temp src: Oral   BP: BP: (152)/(87) 152/87   Pulse: Heart Rate:  [69] 69   Respirations: Resp:  [16] 16   SPO2: SpO2:  [100 %] 100 %   O2 Amount (l/min):     O2 Devices Device (Oxygen Therapy): room air   Weight:           --------------------------------------------------------------------------------    Current Outpatient Medications   Medication Sig Dispense Refill   • cyclobenzaprine (FLEXERIL) 10 MG tablet Take 1 tablet by mouth 3  (Three) Times a Day As Needed for Muscle Spasms. 30 tablet 0   • fluticasone (FLONASE) 50 MCG/ACT nasal spray Administer 2 sprays into the nostril(s) as directed by provider Daily. 16 g 0     Current Facility-Administered Medications   Medication Dose Route Frequency Provider Last Rate Last Admin   • fentaNYL citrate (PF) (SUBLIMAZE) injection 25 mcg  25 mcg Intravenous Q1H PRN Rickie Alegria MD       • iopamidol (ISOVUE-M 200) injection 41%  10 mL Epidural Once in imaging Rickie Alegria MD       • lidocaine (XYLOCAINE) 1 % injection 1 mL  1 mL Intradermal Once Rickie Alegria MD       • methylPREDNISolone acetate (DEPO-medrol) injection 80 mg  80 mg Epidural Once RenderRickie MD       • midazolam (VERSED) injection 1 mg  1 mg Intravenous Once PRN Rickie Alegria MD           --------------------------------------------------------------------------------  Assessment & Plan      Anesthesia Evaluation           Pain impairs ability to perform ADLs: Ambulation and Working  Modalities previously tried to control pain with limited effectiveness within the last 4-6 weeks: Rest and OTC medications     Airway   Mallampati: II  TM distance: >3 FB  Neck ROM: full  Dental      Pulmonary    (-) wheezes  Cardiovascular     Rhythm: regular      PE comment: Dorsal pedal pulses are palpable bilaterally    Neuro/Psych  (-) normal sensory deficit, left straight leg raise test, right straight leg raise test  GI/Hepatic/Renal/Endo      Musculoskeletal         PE comment: There is no obvious focal motor weakness in his lower extremities  Abdominal    Substance History      OB/GYN          Other                 Diagnosis and Plan    Treatment Plan  ASA 2   Patient has had previous injection/procedure with 50-75% improvement.   Procedures: Lumbar Epidural Steroid Injection(LESI), With fluoroscopy,      Anesthetic plan and risks discussed with patient.      Discussed with patient risk and benefits of SHASHI including but  "not limited to : Bleeding, infection, PDPH, inadvertant spinal anesthetic, worsening pain, hyperglycemia, hypertension, CHF, nerve damage, steroid \"toxicity\" and AVN of hips.  Pt agrees to proceed.  Diagnosis     * Lumbar radiculopathy [M54.16]     * Intervertebral disc disorder with radiculopathy of lumbar region [M51.16]                      "